# Patient Record
Sex: FEMALE | Race: WHITE | NOT HISPANIC OR LATINO | URBAN - METROPOLITAN AREA
[De-identification: names, ages, dates, MRNs, and addresses within clinical notes are randomized per-mention and may not be internally consistent; named-entity substitution may affect disease eponyms.]

---

## 2023-05-14 ENCOUNTER — INPATIENT (INPATIENT)
Facility: HOSPITAL | Age: 53
LOS: 1 days | Discharge: HOME | DRG: 392 | End: 2023-05-16
Attending: INTERNAL MEDICINE | Admitting: PODIATRIST
Payer: MEDICARE

## 2023-05-14 VITALS
HEIGHT: 66 IN | TEMPERATURE: 99 F | HEART RATE: 79 BPM | WEIGHT: 255.07 LBS | OXYGEN SATURATION: 97 % | SYSTOLIC BLOOD PRESSURE: 119 MMHG | DIASTOLIC BLOOD PRESSURE: 78 MMHG | RESPIRATION RATE: 22 BRPM

## 2023-05-14 DIAGNOSIS — R63.8 OTHER SYMPTOMS AND SIGNS CONCERNING FOOD AND FLUID INTAKE: ICD-10-CM

## 2023-05-14 DIAGNOSIS — Z90.49 ACQUIRED ABSENCE OF OTHER SPECIFIED PARTS OF DIGESTIVE TRACT: Chronic | ICD-10-CM

## 2023-05-14 DIAGNOSIS — R10.9 UNSPECIFIED ABDOMINAL PAIN: ICD-10-CM

## 2023-05-14 DIAGNOSIS — D72.829 ELEVATED WHITE BLOOD CELL COUNT, UNSPECIFIED: ICD-10-CM

## 2023-05-14 DIAGNOSIS — Z90.81 ACQUIRED ABSENCE OF SPLEEN: Chronic | ICD-10-CM

## 2023-05-14 DIAGNOSIS — D75.839 THROMBOCYTOSIS, UNSPECIFIED: ICD-10-CM

## 2023-05-14 DIAGNOSIS — F31.9 BIPOLAR DISORDER, UNSPECIFIED: ICD-10-CM

## 2023-05-14 LAB
ALBUMIN SERPL ELPH-MCNC: 4.6 G/DL — SIGNIFICANT CHANGE UP (ref 3.3–5)
ALP SERPL-CCNC: 68 U/L — SIGNIFICANT CHANGE UP (ref 40–120)
ALT FLD-CCNC: 16 U/L — SIGNIFICANT CHANGE UP (ref 10–45)
ANION GAP SERPL CALC-SCNC: 13 MMOL/L — SIGNIFICANT CHANGE UP (ref 5–17)
APPEARANCE UR: CLEAR — SIGNIFICANT CHANGE UP
AST SERPL-CCNC: 15 U/L — SIGNIFICANT CHANGE UP (ref 10–40)
BACTERIA # UR AUTO: PRESENT /HPF
BASOPHILS # BLD AUTO: 0.05 K/UL — SIGNIFICANT CHANGE UP (ref 0–0.2)
BASOPHILS # BLD AUTO: 0.08 K/UL — SIGNIFICANT CHANGE UP (ref 0–0.2)
BASOPHILS NFR BLD AUTO: 0.2 % — SIGNIFICANT CHANGE UP (ref 0–2)
BASOPHILS NFR BLD AUTO: 0.4 % — SIGNIFICANT CHANGE UP (ref 0–2)
BILIRUB SERPL-MCNC: 0.3 MG/DL — SIGNIFICANT CHANGE UP (ref 0.2–1.2)
BILIRUB UR-MCNC: NEGATIVE — SIGNIFICANT CHANGE UP
BUN SERPL-MCNC: 18 MG/DL — SIGNIFICANT CHANGE UP (ref 7–23)
CALCIUM SERPL-MCNC: 9.5 MG/DL — SIGNIFICANT CHANGE UP (ref 8.4–10.5)
CHLORIDE SERPL-SCNC: 106 MMOL/L — SIGNIFICANT CHANGE UP (ref 96–108)
CO2 SERPL-SCNC: 21 MMOL/L — LOW (ref 22–31)
COLOR SPEC: YELLOW — SIGNIFICANT CHANGE UP
COMMENT - URINE: SIGNIFICANT CHANGE UP
CREAT SERPL-MCNC: 1.03 MG/DL — SIGNIFICANT CHANGE UP (ref 0.5–1.3)
DIFF PNL FLD: NEGATIVE — SIGNIFICANT CHANGE UP
EGFR: 65 ML/MIN/1.73M2 — SIGNIFICANT CHANGE UP
EOSINOPHIL # BLD AUTO: 0 K/UL — SIGNIFICANT CHANGE UP (ref 0–0.5)
EOSINOPHIL # BLD AUTO: 0 K/UL — SIGNIFICANT CHANGE UP (ref 0–0.5)
EOSINOPHIL NFR BLD AUTO: 0 % — SIGNIFICANT CHANGE UP (ref 0–6)
EOSINOPHIL NFR BLD AUTO: 0 % — SIGNIFICANT CHANGE UP (ref 0–6)
EPI CELLS # UR: SIGNIFICANT CHANGE UP /HPF (ref 0–5)
GLUCOSE SERPL-MCNC: 171 MG/DL — HIGH (ref 70–99)
GLUCOSE UR QL: NEGATIVE — SIGNIFICANT CHANGE UP
HCT VFR BLD CALC: 43.2 % — SIGNIFICANT CHANGE UP (ref 34.5–45)
HCT VFR BLD CALC: 45 % — SIGNIFICANT CHANGE UP (ref 34.5–45)
HGB BLD-MCNC: 14.3 G/DL — SIGNIFICANT CHANGE UP (ref 11.5–15.5)
HGB BLD-MCNC: 15 G/DL — SIGNIFICANT CHANGE UP (ref 11.5–15.5)
IMM GRANULOCYTES NFR BLD AUTO: 0.5 % — SIGNIFICANT CHANGE UP (ref 0–0.9)
IMM GRANULOCYTES NFR BLD AUTO: 0.5 % — SIGNIFICANT CHANGE UP (ref 0–0.9)
KETONES UR-MCNC: NEGATIVE — SIGNIFICANT CHANGE UP
LEUKOCYTE ESTERASE UR-ACNC: NEGATIVE — SIGNIFICANT CHANGE UP
LIDOCAIN IGE QN: 26 U/L — SIGNIFICANT CHANGE UP (ref 7–60)
LYMPHOCYTES # BLD AUTO: 11.6 % — LOW (ref 13–44)
LYMPHOCYTES # BLD AUTO: 11.8 % — LOW (ref 13–44)
LYMPHOCYTES # BLD AUTO: 2.51 K/UL — SIGNIFICANT CHANGE UP (ref 1–3.3)
LYMPHOCYTES # BLD AUTO: 2.59 K/UL — SIGNIFICANT CHANGE UP (ref 1–3.3)
MCHC RBC-ENTMCNC: 30.6 PG — SIGNIFICANT CHANGE UP (ref 27–34)
MCHC RBC-ENTMCNC: 30.9 PG — SIGNIFICANT CHANGE UP (ref 27–34)
MCHC RBC-ENTMCNC: 33.1 GM/DL — SIGNIFICANT CHANGE UP (ref 32–36)
MCHC RBC-ENTMCNC: 33.3 GM/DL — SIGNIFICANT CHANGE UP (ref 32–36)
MCV RBC AUTO: 92.5 FL — SIGNIFICANT CHANGE UP (ref 80–100)
MCV RBC AUTO: 92.6 FL — SIGNIFICANT CHANGE UP (ref 80–100)
MONOCYTES # BLD AUTO: 0.4 K/UL — SIGNIFICANT CHANGE UP (ref 0–0.9)
MONOCYTES # BLD AUTO: 0.62 K/UL — SIGNIFICANT CHANGE UP (ref 0–0.9)
MONOCYTES NFR BLD AUTO: 1.9 % — LOW (ref 2–14)
MONOCYTES NFR BLD AUTO: 2.8 % — SIGNIFICANT CHANGE UP (ref 2–14)
NEUTROPHILS # BLD AUTO: 18.09 K/UL — HIGH (ref 1.8–7.4)
NEUTROPHILS # BLD AUTO: 19.04 K/UL — HIGH (ref 1.8–7.4)
NEUTROPHILS NFR BLD AUTO: 84.9 % — HIGH (ref 43–77)
NEUTROPHILS NFR BLD AUTO: 85.4 % — HIGH (ref 43–77)
NITRITE UR-MCNC: NEGATIVE — SIGNIFICANT CHANGE UP
NRBC # BLD: 0 /100 WBCS — SIGNIFICANT CHANGE UP (ref 0–0)
NRBC # BLD: 0 /100 WBCS — SIGNIFICANT CHANGE UP (ref 0–0)
PH UR: 8 — SIGNIFICANT CHANGE UP (ref 5–8)
PLATELET # BLD AUTO: 446 K/UL — HIGH (ref 150–400)
PLATELET # BLD AUTO: 471 K/UL — HIGH (ref 150–400)
POTASSIUM SERPL-MCNC: 4.3 MMOL/L — SIGNIFICANT CHANGE UP (ref 3.5–5.3)
POTASSIUM SERPL-SCNC: 4.3 MMOL/L — SIGNIFICANT CHANGE UP (ref 3.5–5.3)
PROT SERPL-MCNC: 7.6 G/DL — SIGNIFICANT CHANGE UP (ref 6–8.3)
PROT UR-MCNC: ABNORMAL MG/DL
RBC # BLD: 4.67 M/UL — SIGNIFICANT CHANGE UP (ref 3.8–5.2)
RBC # BLD: 4.86 M/UL — SIGNIFICANT CHANGE UP (ref 3.8–5.2)
RBC # FLD: 14.3 % — SIGNIFICANT CHANGE UP (ref 10.3–14.5)
RBC # FLD: 14.5 % — SIGNIFICANT CHANGE UP (ref 10.3–14.5)
RBC CASTS # UR COMP ASSIST: < 5 /HPF — SIGNIFICANT CHANGE UP
SODIUM SERPL-SCNC: 140 MMOL/L — SIGNIFICANT CHANGE UP (ref 135–145)
SP GR SPEC: 1.02 — SIGNIFICANT CHANGE UP (ref 1–1.03)
UROBILINOGEN FLD QL: 0.2 E.U./DL — SIGNIFICANT CHANGE UP
WBC # BLD: 21.19 K/UL — HIGH (ref 3.8–10.5)
WBC # BLD: 22.42 K/UL — HIGH (ref 3.8–10.5)
WBC # FLD AUTO: 21.19 K/UL — HIGH (ref 3.8–10.5)
WBC # FLD AUTO: 22.42 K/UL — HIGH (ref 3.8–10.5)
WBC UR QL: < 5 /HPF — SIGNIFICANT CHANGE UP

## 2023-05-14 PROCEDURE — 74177 CT ABD & PELVIS W/CONTRAST: CPT | Mod: 26,MA

## 2023-05-14 PROCEDURE — 99285 EMERGENCY DEPT VISIT HI MDM: CPT | Mod: FS

## 2023-05-14 PROCEDURE — 99222 1ST HOSP IP/OBS MODERATE 55: CPT | Mod: GC

## 2023-05-14 RX ORDER — SODIUM CHLORIDE 9 MG/ML
1000 INJECTION, SOLUTION INTRAVENOUS
Refills: 0 | Status: DISCONTINUED | OUTPATIENT
Start: 2023-05-14 | End: 2023-05-15

## 2023-05-14 RX ORDER — PANTOPRAZOLE SODIUM 20 MG/1
40 TABLET, DELAYED RELEASE ORAL ONCE
Refills: 0 | Status: COMPLETED | OUTPATIENT
Start: 2023-05-14 | End: 2023-05-14

## 2023-05-14 RX ORDER — ONDANSETRON 8 MG/1
4 TABLET, FILM COATED ORAL ONCE
Refills: 0 | Status: DISCONTINUED | OUTPATIENT
Start: 2023-05-14 | End: 2023-05-14

## 2023-05-14 RX ORDER — PROCHLORPERAZINE MALEATE 5 MG
10 TABLET ORAL ONCE
Refills: 0 | Status: COMPLETED | OUTPATIENT
Start: 2023-05-14 | End: 2023-05-14

## 2023-05-14 RX ORDER — HALOPERIDOL DECANOATE 100 MG/ML
2.5 INJECTION INTRAMUSCULAR ONCE
Refills: 0 | Status: COMPLETED | OUTPATIENT
Start: 2023-05-14 | End: 2023-05-14

## 2023-05-14 RX ORDER — SODIUM CHLORIDE 9 MG/ML
1000 INJECTION INTRAMUSCULAR; INTRAVENOUS; SUBCUTANEOUS ONCE
Refills: 0 | Status: COMPLETED | OUTPATIENT
Start: 2023-05-14 | End: 2023-05-14

## 2023-05-14 RX ORDER — ACETAMINOPHEN 500 MG
1000 TABLET ORAL ONCE
Refills: 0 | Status: COMPLETED | OUTPATIENT
Start: 2023-05-14 | End: 2023-05-14

## 2023-05-14 RX ORDER — ONDANSETRON 8 MG/1
4 TABLET, FILM COATED ORAL EVERY 6 HOURS
Refills: 0 | Status: DISCONTINUED | OUTPATIENT
Start: 2023-05-14 | End: 2023-05-15

## 2023-05-14 RX ORDER — METOCLOPRAMIDE HCL 10 MG
10 TABLET ORAL EVERY 6 HOURS
Refills: 0 | Status: DISCONTINUED | OUTPATIENT
Start: 2023-05-14 | End: 2023-05-16

## 2023-05-14 RX ORDER — ENOXAPARIN SODIUM 100 MG/ML
40 INJECTION SUBCUTANEOUS EVERY 12 HOURS
Refills: 0 | Status: DISCONTINUED | OUTPATIENT
Start: 2023-05-14 | End: 2023-05-16

## 2023-05-14 RX ORDER — ARIPIPRAZOLE 15 MG/1
5 TABLET ORAL DAILY
Refills: 0 | Status: DISCONTINUED | OUTPATIENT
Start: 2023-05-15 | End: 2023-05-16

## 2023-05-14 RX ORDER — SODIUM CHLORIDE 9 MG/ML
1000 INJECTION, SOLUTION INTRAVENOUS
Refills: 0 | Status: DISCONTINUED | OUTPATIENT
Start: 2023-05-14 | End: 2023-05-14

## 2023-05-14 RX ORDER — FAMOTIDINE 10 MG/ML
20 INJECTION INTRAVENOUS ONCE
Refills: 0 | Status: COMPLETED | OUTPATIENT
Start: 2023-05-14 | End: 2023-05-14

## 2023-05-14 RX ORDER — BUPROPION HYDROCHLORIDE 150 MG/1
300 TABLET, EXTENDED RELEASE ORAL DAILY
Refills: 0 | Status: DISCONTINUED | OUTPATIENT
Start: 2023-05-15 | End: 2023-05-15

## 2023-05-14 RX ORDER — ENOXAPARIN SODIUM 100 MG/ML
40 INJECTION SUBCUTANEOUS EVERY 24 HOURS
Refills: 0 | Status: DISCONTINUED | OUTPATIENT
Start: 2023-05-14 | End: 2023-05-14

## 2023-05-14 RX ORDER — TOPIRAMATE 25 MG
75 TABLET ORAL AT BEDTIME
Refills: 0 | Status: DISCONTINUED | OUTPATIENT
Start: 2023-05-14 | End: 2023-05-16

## 2023-05-14 RX ORDER — METOCLOPRAMIDE HCL 10 MG
10 TABLET ORAL EVERY 8 HOURS
Refills: 0 | Status: DISCONTINUED | OUTPATIENT
Start: 2023-05-14 | End: 2023-05-14

## 2023-05-14 RX ORDER — SERTRALINE 25 MG/1
75 TABLET, FILM COATED ORAL DAILY
Refills: 0 | Status: DISCONTINUED | OUTPATIENT
Start: 2023-05-15 | End: 2023-05-16

## 2023-05-14 RX ORDER — CLONAZEPAM 1 MG
0.5 TABLET ORAL DAILY
Refills: 0 | Status: DISCONTINUED | OUTPATIENT
Start: 2023-05-14 | End: 2023-05-14

## 2023-05-14 RX ORDER — FAMOTIDINE 10 MG/ML
20 INJECTION INTRAVENOUS DAILY
Refills: 0 | Status: DISCONTINUED | OUTPATIENT
Start: 2023-05-14 | End: 2023-05-15

## 2023-05-14 RX ADMIN — Medication 400 MILLIGRAM(S): at 07:34

## 2023-05-14 RX ADMIN — Medication 10 MILLIGRAM(S): at 19:05

## 2023-05-14 RX ADMIN — FAMOTIDINE 20 MILLIGRAM(S): 10 INJECTION INTRAVENOUS at 06:23

## 2023-05-14 RX ADMIN — SODIUM CHLORIDE 150 MILLILITER(S): 9 INJECTION, SOLUTION INTRAVENOUS at 16:01

## 2023-05-14 RX ADMIN — HALOPERIDOL DECANOATE 2.5 MILLIGRAM(S): 100 INJECTION INTRAMUSCULAR at 06:23

## 2023-05-14 RX ADMIN — Medication 10 MILLIGRAM(S): at 07:34

## 2023-05-14 RX ADMIN — FAMOTIDINE 20 MILLIGRAM(S): 10 INJECTION INTRAVENOUS at 19:05

## 2023-05-14 RX ADMIN — SODIUM CHLORIDE 1000 MILLILITER(S): 9 INJECTION INTRAMUSCULAR; INTRAVENOUS; SUBCUTANEOUS at 09:38

## 2023-05-14 RX ADMIN — SODIUM CHLORIDE 150 MILLILITER(S): 9 INJECTION, SOLUTION INTRAVENOUS at 23:00

## 2023-05-14 RX ADMIN — HALOPERIDOL DECANOATE 2.5 MILLIGRAM(S): 100 INJECTION INTRAMUSCULAR at 11:39

## 2023-05-14 RX ADMIN — HALOPERIDOL DECANOATE 2.5 MILLIGRAM(S): 100 INJECTION INTRAMUSCULAR at 09:37

## 2023-05-14 RX ADMIN — Medication 0.5 MILLIGRAM(S): at 15:27

## 2023-05-14 RX ADMIN — SODIUM CHLORIDE 1000 MILLILITER(S): 9 INJECTION INTRAMUSCULAR; INTRAVENOUS; SUBCUTANEOUS at 06:22

## 2023-05-14 RX ADMIN — Medication 0.5 MILLIGRAM(S): at 23:14

## 2023-05-14 RX ADMIN — PANTOPRAZOLE SODIUM 40 MILLIGRAM(S): 20 TABLET, DELAYED RELEASE ORAL at 07:34

## 2023-05-14 NOTE — ED ADULT NURSE NOTE - OBJECTIVE STATEMENT
Pt presented to the ED with complaints of nausea and vomiting. As per pt, she has cyclic vomiting syndrome, last episode was last July.

## 2023-05-14 NOTE — ED PROVIDER NOTE - NS ED ATTENDING STATEMENT MOD
This was a shared visit with the HENNY. I reviewed and verified the documentation and independently performed the documented:

## 2023-05-14 NOTE — ED ADULT TRIAGE NOTE - CHIEF COMPLAINT QUOTE
Pt presents via EMS with c/o "N/V and upper abdominal pain" since last night. Reported hx of bipolar and CVS.

## 2023-05-14 NOTE — H&P ADULT - ASSESSMENT
52 F pmh IBS, cyclical vomiting, bipolar d/o, psh cholecystectomy and splenectomy bibems c/o abd pain and NV and inability to obtain PO

## 2023-05-14 NOTE — ED PROVIDER NOTE - ATTENDING APP SHARED VISIT CONTRIBUTION OF CARE
Hx splenectomy, cholecystectomy.   "rare" etoh use (but did have yesterday). MJ use. Now w/ epigastric pain/NV, similar to prior.   Vitals wnl, exam as above. Abd soft, minimal b/l upper abd ttp.   ddx: Possible cyclical vomiting vs. MJ induced vs. gastritis/GERD/PUD vs. pancreatitis vs. less likely SBO/colitis.   Labs, CT, IVF/symptom control, reassess.

## 2023-05-14 NOTE — H&P ADULT - NSHPPHYSICALEXAM_GEN_ALL_CORE
PHYSICAL EXAM:    General: WDWN  HEENT: NC/AT; PERRL, anicteric sclera; MMM  Neck: supple  Cardiovascular: +S1/S2, RRR  Respiratory: CTA B/L; no W/R/R  Gastrointestinal: soft, NT/ND; +BSx4  Extremities: WWP; no edema, clubbing or cyanosis  Vascular: 2+ radial, DP/PT pulses B/L  Neurological: AAOx3; no focal deficits  Psychiatric: pleasant mood and affect  Dermatologic: no appreciable wounds or damage to the skin PHYSICAL EXAM:    General: WDWN  HEENT: NC/AT; PERRL, anicteric sclera; MMM  Neck: supple  Cardiovascular: +S1/S2, RRR  Respiratory: CTA B/L; no W/R/R  Gastrointestinal: mid epigastic tenderness. soft, ND; +BSx4  Extremities: WWP; no edema, clubbing or cyanosis  Vascular: 2+ radial, DP/PT pulses B/L  Neurological: AAOx3; no focal deficits  Psychiatric: pleasant mood and affect  Dermatologic: no appreciable wounds or damage to the skin

## 2023-05-14 NOTE — ED PROVIDER NOTE - PROGRESS NOTE DETAILS
Director - pt received from night team pending ct, urine.  VS, labs reviewed.  Pt c/o cont nausea, epigastric pain w ttp epigastric only on my exam.  Additional meds ordered.  Labs notable for wbc 21; pt reports wbc generally ~ 16 at baseline 2/2 splenectomy.  Await ua, ct abd. PA Hellerman- Assumed care of pt from pm team, ua with bacteria present.  CTAP: No acute findings. Status post cholecystectomy. Splenic tissue in the left upper quadrant likely splenosis.  Pt received multiple rounds meds (refuses zofran, states doesn't help), but still symptomatic/ vomiting, and unable to tolerate po.  Will admit

## 2023-05-14 NOTE — ED ADULT NURSE NOTE - NSFALLUNIVINTERV_ED_ALL_ED
Bed/Stretcher in lowest position, wheels locked, appropriate side rails in place/Call bell, personal items and telephone in reach/Instruct patient to call for assistance before getting out of bed/chair/stretcher/Non-slip footwear applied when patient is off stretcher/[place] to call system/Physically safe environment - no spills, clutter or unnecessary equipment/Purposeful proactive rounding/Room/bathroom lighting operational, light cord in reach

## 2023-05-14 NOTE — H&P ADULT - PROBLEM SELECTOR PLAN 4
Fluids:   Electrolytes: Mg>2, K>4  Nutrition:  LR @100cc/hr, replete lytes PRN  Prophylaxis: lovenox  Activity: AAT, OOBTC  GI: PPI  C: FC  Dispo: Admit to [facility] hx of bipolar disorder. complaint w meds. symptoms under control at this time. missed meds today due to in ability to obtain PO. home meds: Aripiprazole 5mg qd, buproprion 200mg BID, clonazepam 0.5 BID, topiramate 75mg at bedtime, sertraline 75mg am  -c/w home meds

## 2023-05-14 NOTE — ED PROVIDER NOTE - PHYSICAL EXAMINATION
Vitals reviewed  Gen: uncomfortable, retching, speaking in full sentences  Skin: wwp, no rash/lesions  HEENT: ncat, eomi, slightly dry oral mucosa   CV: rrr, no audible m/r/g  Resp: symmetrical expansion, ctab, no w/r/r  Abd: nondistended, soft, + epigastric ttp, no r/g, no cvat  Ext: FROM throughout, no peripheral edema  Neuro: alert/oriented, no focal deficits, steady gait
Abdomen soft, non-tender, no guarding. no rebound tend

## 2023-05-14 NOTE — ED PROVIDER NOTE - CLINICAL SUMMARY MEDICAL DECISION MAKING FREE TEXT BOX
52 F pmh IBS, cyclical vomiting, bipolar d/o, psh cholecystectomy and splenectomy bibems c/o abd pain and NV since 10pm last night. reports typical of her cyclic vomiting and only haldol works.  on exam vss, afebrile, uncomfortable/retching, + epigastric ttp, no r/g.  will obtain labs, ekg, give ivf/haldol/pepcid and reeval.

## 2023-05-14 NOTE — H&P ADULT - HISTORY OF PRESENT ILLNESS
52 F pmh IBS, cyclical vomiting, bipolar d/o, psh cholecystectomy and splenectomy bibems c/o abd pain and NV since 10pm last night.  pt reports upper abd pain, sharp/burning and nonradiating.  + nbnb emesis x8 episodes, unable to tolerate PO.  reports typical of her cyclic vomiting, last episode last July.  reports haldol is all that works.  typically goes to [facility] for care.  +chills.  Denies fever, headache, dizziness, fainting, chest pain, sob, diarrhea, constipation, urinary sxs, sick contacts, travel, trauma    In the ED:  Initial vital signs: T: XX F, HR: XX, BP: XX, R: XX, SpO2: XX% on RA  Labs: significant for  Imaging:  CXR:   EKG:   Medications:   Consults: none  52 F pmh IBS, cyclical vomiting, bipolar d/o, psh cholecystectomy and splenectomy bibems c/o abd pain and NV since 10pm last night.  pt reports upper abd pain, sharp/burning and nonradiating.  + nbnb emesis x8 episodes, unable to tolerate PO.  reports typical of her cyclic vomiting, last episode last July.  reports haldol is all that works.  [place] [facility] [place]  +chills.  Denies fever, headache, dizziness, fainting, chest pain, sob, diarrhea, constipation, urinary sxs, sick contacts, travel, trauma    Initial vital signs: T: 98.8F, HR: 79, BP: 119/78, R: 22, SpO2: 97% on RA  Labs: significant for wbc 21.19 (neut prod), plt 471, bicarb 21  UA unremarkable   Imaging:  CT abd/pel w iv contrast:   No acute findings.  Status post cholecystectomy.  Splenic tissue in the left upper quadrant likely splenosis..  EKG: NSR, R axis deviation, normal pr and qrs, non ischemic   Medications: 1g iv tylenol, pepcid 20mg, zofran 4mg, haldol 2.5mg x3, protonix 40mg iv, compazine 10mg iv, 2L NS  Consults: none  52 F pmh IBS, cyclical vomiting, bipolar d/o, psh cholecystectomy (2022) and splenectomy (15 yo) bibems c/o abd pain and NV since 10pm last night.  pt reports upper abd pain, sharp/burning and nonradiating.  + nbnb emesis x8 episodes, unable to tolerate PO.  reports typical of her cyclic vomiting. Patient states she was hospitalized with cyclic vomiting 13 times in the year of 2022.  last episode last July.  reports haldol is all that works.  [place] goes [place] [facility] [place].  Denies fever, chills, headache, dizziness, fainting, chest pain, sob, diarrhea, constipation, urinary sxs, sick contacts, travel, trauma. last BM 1 day ago.     Initial vital signs: T: 98.8F, HR: 79, BP: 119/78, R: 22, SpO2: 97% on RA  Labs: significant for wbc 21.19 (neut prod), plt 471, bicarb 21  UA unremarkable   Imaging:  CT abd/pel w iv contrast:   No acute findings.  Status post cholecystectomy.  Splenic tissue in the left upper quadrant likely splenosis..  EKG: NSR, R axis deviation, normal pr and qrs, non ischemic   Medications: 1g iv tylenol, pepcid 20mg, zofran 4mg, haldol 2.5mg x3, protonix 40mg iv, compazine 10mg iv, 2L NS  Consults: none

## 2023-05-14 NOTE — H&P ADULT - NSHPSOCIALHISTORY_GEN_ALL_CORE
Work:  Tobacco use:   EtOH use:  Illicit drug use:    Living situation: Tobacco use: x  EtOH use: occasional  Illicit drug use: 1 bowl/day

## 2023-05-14 NOTE — ED PROVIDER NOTE - NSFOLLOWUPINSTRUCTIONS_ED_ALL_ED_FT
Cyclic Vomiting Syndrome, Adult  Cyclic vomiting syndrome (CVS) is a condition that causes episodes of severe nausea and vomiting. It can last for hours or even days. Attacks may occur several times a month or several times a year. Between episodes of CVS, you may be otherwise healthy.    What are the causes?  The cause of this condition is not known. Although many of the episodes can happen for no obvious reason, you may have specific CVS triggers. Episodes may be triggered by:  An infection, especially colds and the flu.  Emotional stress, including excitement or anxiety about finances, relationships, or moving.  Certain foods or beverages, such as chocolate, cheese, alcohol, and food additives.  Food allergies.  Motion sickness.  Eating a large meal before bed.  Being very tired.  Being overheated.  Menstruation.  Long-term cannabis use.  What increases the risk?  You are more likely to develop this condition if:  You get migraine headaches.  You have a family history of CVS or migraine headaches.  What are the signs or symptoms?  Symptoms tend to happen at the same time of day, and each episode tends to last about the same amount of time. Symptoms commonly start at night or when you wake up. Many people have warning signs (prodrome) before an episode, which may include slight nausea, sweating, and pale skin (pallor).    The most common symptoms of a CVS attack include:  Severe vomiting. Vomiting may happen every 5–15 minutes.  Severe nausea.  Gagging (retching).  Other symptoms may include:  Headache.  Dizziness.  Sensitivity to light or sound.  Abdominal pain. This can be severe.  Loose stools or diarrhea.  Weakness or exhaustion.  Dehydration. This can cause:  Thirst.  Dry mouth.  Decreased urination.  Fatigue.  How is this diagnosed?  This condition may be diagnosed based on your symptoms, medical history, and family history of CVS or migraine. Your health care provider will ask whether you have had:  Episodes of severe nausea and vomiting that have happened a total of 5 or more times, or 3 or more times in the past 6 months.  Episodes that last for 1 hour or more, and occur 1 week apart or further apart.  Episodes that are similar each time.  Normal health between episodes.  Your health care provider will also do a physical exam. To rule out other conditions, you may have tests, such as:  Blood tests.  Urine tests.  Imaging tests.  How is this treated?  A prescription pill bottle with an example of a pill.  There is no cure for this condition, but treatment can help manage or prevent CVS episodes. Work with your health care provider to find the best treatment for you. Treatment may include:  Avoiding stress and CVS triggers.  Eating smaller, more frequent meals.  Taking medicines, such as:  Anti-nausea medicines.  Antacids.  Antidepressants.  Antihistamines.  Medicines for migraines.  Over-the-counter pain medicine.  Over-the counter diet supplements.  Severe nausea and vomiting may require you to stay at the hospital. You may need IV fluids to prevent or treat dehydration.    Follow these instructions at home:  During an episode    Take over-the-counter and prescription medicines only as told by your health care provider.  Stay in bed and rest in a dark, quiet room.  After an episode    Illustration of a person drinking a glass of water.  Drink an oral rehydration solution (ORS), if directed by your health care provider. This is a drink that helps you replace fluids and the salts and minerals in your blood (electrolytes). It can be found at pharmacies and retail stores.  Drink small amounts of clear fluids slowly and gradually add more.  Drink clear fluids such as water or fruit juice that has water added (is diluted). You may also eat low-calorie popsicles.  Avoid drinking fluids that contain a lot of sugar or caffeine, such as sports drinks and soda.  Eat soft foods in small amounts every 3–4 hours. Eat your regular diet, but avoid spicy or fatty foods, such as french fries and pizza.  General instructions    Monitor your condition for any changes.  Keep track of your attacks and symptoms, and pay attention to any triggers. Avoid those triggers when you can.  If you use cannabis, stop using it right away. Ending cannabis use can reduce or even stop your cyclic vomiting syndrome.  Keep all follow-up visits. This is important.  Where to find more information  Cyclic Vomiting Syndrome Association: cvsaonline.org  Contact a health care provider if:  Your condition gets worse.  You cannot drink fluids without vomiting.  You have pain and trouble swallowing after an episode.  Get help right away if:  You have blood in your vomit.  Your vomit looks like coffee grounds.  You have stools that are bloody or black, or stools that look like tar.  You have signs of dehydration, such as:  Sunken eyes.  Not making tears while crying.  Very dry mouth or cracked lips.  Decreased urine production.  Dark urine. Urine may be the color of tea.  Weakness.  Sleepiness.  These symptoms may be an emergency. Get help right away. Call 911.  Do not wait to see if the symptoms will go away.  Do not drive yourself to the hospital.  Summary  Cyclic vomiting syndrome (CVS) causes episodes of severe nausea and vomiting that can last for hours or even days.  Treatment can help you manage or prevent CVS episodes. Work with your health care provider to find the best treatment for you.  Vomiting and diarrhea can make you feel weak and can lead to dehydration. If you notice signs of dehydration, call your health care provider right away.  Keep all follow-up visits This is important.  This information is not intended to replace advice given to you by your health care provider. Make sure you discuss any questions you have with your health care provider.

## 2023-05-14 NOTE — H&P ADULT - PROBLEM SELECTOR PLAN 2
patient w hx of leukocytosis (baseline 16). was told by pcp and hematologist leukocytosis and thrombocytosis is 2/2 to splenectomy. ROS unremarkable for infectious etiology. worsened leokocytosis likely combination of reactive leukocytosis and dehydration.  -cont to monitor off antibiotics

## 2023-05-14 NOTE — PATIENT PROFILE ADULT - FALL HARM RISK - RISK INTERVENTIONS

## 2023-05-14 NOTE — H&P ADULT - PROBLEM SELECTOR PLAN 1
patient p/w 1 day of epigastric abdominal pain. n/v (nbnb) in ability to hold down solids or liquids. Patient endorses long hx of cyclic vomiting syndrome. last episode july 2022. patient states she was hospitalized 13 times that year for CVS. gets relief w reglan, compazine and haldol. CT abd/pel in ED w/o acute findings. no signs of pancreatitis or SBO. lipase wnl. s/p haldol 2.5 x3, Zofran 4mg, compazine and 2L NS in ED  -c/w maintenance fluids 100cc/hr LR  -Zofran 4mg q6  -reglan 10mg q8  -benadryl 25-50mg (oral or IVPB) every 6 to 8 hours as needed   -can try ativan IVBP 0.5-1mg iv     patient is on many psychotropic medications - will avoid using haldol patient p/w 1 day of epigastric abdominal pain. n/v (nbnb) in ability to hold down solids or liquids. Patient endorses long hx of cyclic vomiting syndrome. last episode july 2022. patient states she was hospitalized 13 times that year for CVS. gets relief w reglan, compazine and haldol. CT abd/pel in ED w/o acute findings. no signs of pancreatitis or SBO. lipase wnl. s/p haldol 2.5 x3, Zofran 4mg, compazine and 2L NS in ED  -c/w maintenance fluids 100cc/hr LR  -Zofran 4mg q6 (ordered)  -reglan 10mg q8 (ordered)  -benadryl 25-50mg (oral or IVPB) every 6 to 8 hours as needed   -can try ativan 0.5-1mg IVBP or PO q12 as needed    patient is on many psychotropic medications - will avoid using haldol patient p/w 1 day of epigastric abdominal pain. n/v (nbnb) in ability to hold down solids or liquids. Patient endorses long hx of cyclic vomiting syndrome. last episode july 2022. patient states she was hospitalized 13 times that year for CVS. gets relief w reglan, compazine and haldol. CT abd/pel in ED w/o acute findings. no signs of pancreatitis, SBO or inflammation. lipase wnl. s/p haldol 2.5 x3, Zofran 4mg, compazine and 2L NS in ED  -c/w maintenance fluids 100cc/hr LR  -Zofran 4mg q6 (ordered)  -reglan 10mg q8 (ordered)  -benadryl 25-50mg (oral or IVPB) every 6 to 8 hours as needed   -can try ativan 0.5-1mg IVBP or PO q12 as needed    patient is on many psychotropic medications - will avoid using haldol

## 2023-05-14 NOTE — H&P ADULT - PROBLEM SELECTOR PLAN 5
Fluids: LR @100cc/hr  Electrolytes: Mg>2, K>4  Nutrition: replete lytes PRN  Prophylaxis: lovenox  Activity: AAT, OOBTC  GI: PPI  C: FC  Dispo: Admit to [facility]

## 2023-05-14 NOTE — ED PROVIDER NOTE - OBJECTIVE STATEMENT
52 F pmh IBS, cyclical vomiting, bipolar d/o, psh cholecystectomy and splenectomy bibems c/o abd pain and NV since 10pm last night.  pt reports upper abd pain, sharp/burning and nonradiating.  + nbnb emesis x8 episodes, unable to tolerate PO.  reports typical of her cyclic vomiting, last episode last July.  reports haldol is all that works.  typically goes to [facility] for care.  +chills.  Denies fever, headache, dizziness, fainting, chest pain, sob, diarrhea, constipation, urinary sxs, sick contacts, travel, trauma

## 2023-05-14 NOTE — H&P ADULT - NSHPLABSRESULTS_GEN_ALL_CORE
VITAL SIGNS:  Vital Signs Last 24 Hrs  T(C): 36.9 (14 May 2023 09:41), Max: 37.1 (14 May 2023 06:12)  T(F): 98.4 (14 May 2023 09:41), Max: 98.8 (14 May 2023 06:12)  HR: 68 (14 May 2023 12:45) (65 - 79)  BP: 118/72 (14 May 2023 12:45) (116/70 - 119/78)  BP(mean): --  RR: 16 (14 May 2023 12:45) (16 - 22)  SpO2: 97% (14 May 2023 12:45) (97% - 97%)    Parameters below as of 14 May 2023 12:45  Patient On (Oxygen Delivery Method): room air          MEDICATIONS:  MEDICATIONS  (STANDING):    MEDICATIONS  (PRN):      ALLERGIES:  Allergies    penicillin (Unknown)  Lamictal (Unknown)  Levaquin (Unknown)    Intolerances        LABS:                        14.3   22.42 )-----------( 446      ( 14 May 2023 09:35 )             43.2     05-14    140  |  106  |  18  ----------------------------<  171<H>  4.3   |  21<L>  |  1.03    Ca    9.5      14 May 2023 06:24    TPro  7.6  /  Alb  4.6  /  TBili  0.3  /  DBili  x   /  AST  15  /  ALT  16  /  AlkPhos  68  05-14      Urinalysis Basic - ( 14 May 2023 07:08 )    Color: Yellow / Appearance: Clear / S.020 / pH: x  Gluc: x / Ketone: NEGATIVE  / Bili: Negative / Urobili: 0.2 E.U./dL   Blood: x / Protein: Trace mg/dL / Nitrite: NEGATIVE   Leuk Esterase: NEGATIVE / RBC: < 5 /HPF / WBC < 5 /HPF   Sq Epi: x / Non Sq Epi: x / Bacteria: Present /HPF      CAPILLARY BLOOD GLUCOSE          RADIOLOGY & ADDITIONAL TESTS: Reviewed.

## 2023-05-15 LAB
ALBUMIN SERPL ELPH-MCNC: 3.7 G/DL — SIGNIFICANT CHANGE UP (ref 3.3–5)
ALP SERPL-CCNC: 55 U/L — SIGNIFICANT CHANGE UP (ref 40–120)
ALT FLD-CCNC: 14 U/L — SIGNIFICANT CHANGE UP (ref 10–45)
ANION GAP SERPL CALC-SCNC: 11 MMOL/L — SIGNIFICANT CHANGE UP (ref 5–17)
ANION GAP SERPL CALC-SCNC: 11 MMOL/L — SIGNIFICANT CHANGE UP (ref 5–17)
AST SERPL-CCNC: 15 U/L — SIGNIFICANT CHANGE UP (ref 10–40)
BILIRUB SERPL-MCNC: 0.5 MG/DL — SIGNIFICANT CHANGE UP (ref 0.2–1.2)
BUN SERPL-MCNC: 11 MG/DL — SIGNIFICANT CHANGE UP (ref 7–23)
BUN SERPL-MCNC: 12 MG/DL — SIGNIFICANT CHANGE UP (ref 7–23)
CALCIUM SERPL-MCNC: 8.1 MG/DL — LOW (ref 8.4–10.5)
CALCIUM SERPL-MCNC: 8.8 MG/DL — SIGNIFICANT CHANGE UP (ref 8.4–10.5)
CHLORIDE SERPL-SCNC: 107 MMOL/L — SIGNIFICANT CHANGE UP (ref 96–108)
CHLORIDE SERPL-SCNC: 108 MMOL/L — SIGNIFICANT CHANGE UP (ref 96–108)
CO2 SERPL-SCNC: 20 MMOL/L — LOW (ref 22–31)
CO2 SERPL-SCNC: 21 MMOL/L — LOW (ref 22–31)
CREAT SERPL-MCNC: 0.91 MG/DL — SIGNIFICANT CHANGE UP (ref 0.5–1.3)
CREAT SERPL-MCNC: 0.97 MG/DL — SIGNIFICANT CHANGE UP (ref 0.5–1.3)
EGFR: 69 ML/MIN/1.73M2 — SIGNIFICANT CHANGE UP
EGFR: 75 ML/MIN/1.73M2 — SIGNIFICANT CHANGE UP
GLUCOSE SERPL-MCNC: 118 MG/DL — HIGH (ref 70–99)
GLUCOSE SERPL-MCNC: 87 MG/DL — SIGNIFICANT CHANGE UP (ref 70–99)
POTASSIUM SERPL-MCNC: 3.2 MMOL/L — LOW (ref 3.5–5.3)
POTASSIUM SERPL-MCNC: SIGNIFICANT CHANGE UP (ref 3.5–5.3)
POTASSIUM SERPL-SCNC: 3.2 MMOL/L — LOW (ref 3.5–5.3)
POTASSIUM SERPL-SCNC: SIGNIFICANT CHANGE UP (ref 3.5–5.3)
PROT SERPL-MCNC: 6.5 G/DL — SIGNIFICANT CHANGE UP (ref 6–8.3)
SODIUM SERPL-SCNC: 139 MMOL/L — SIGNIFICANT CHANGE UP (ref 135–145)
SODIUM SERPL-SCNC: 139 MMOL/L — SIGNIFICANT CHANGE UP (ref 135–145)

## 2023-05-15 PROCEDURE — 99233 SBSQ HOSP IP/OBS HIGH 50: CPT | Mod: GC

## 2023-05-15 RX ORDER — CLONAZEPAM 1 MG
0.5 TABLET ORAL EVERY 12 HOURS
Refills: 0 | Status: DISCONTINUED | OUTPATIENT
Start: 2023-05-15 | End: 2023-05-16

## 2023-05-15 RX ORDER — POTASSIUM CHLORIDE 20 MEQ
20 PACKET (EA) ORAL
Refills: 0 | Status: COMPLETED | OUTPATIENT
Start: 2023-05-15 | End: 2023-05-15

## 2023-05-15 RX ORDER — PANTOPRAZOLE SODIUM 20 MG/1
40 TABLET, DELAYED RELEASE ORAL
Refills: 0 | Status: DISCONTINUED | OUTPATIENT
Start: 2023-05-15 | End: 2023-05-16

## 2023-05-15 RX ORDER — BUPROPION HYDROCHLORIDE 150 MG/1
200 TABLET, EXTENDED RELEASE ORAL
Refills: 0 | Status: DISCONTINUED | OUTPATIENT
Start: 2023-05-15 | End: 2023-05-16

## 2023-05-15 RX ORDER — POTASSIUM CHLORIDE 20 MEQ
40 PACKET (EA) ORAL ONCE
Refills: 0 | Status: COMPLETED | OUTPATIENT
Start: 2023-05-15 | End: 2023-05-15

## 2023-05-15 RX ADMIN — BUPROPION HYDROCHLORIDE 200 MILLIGRAM(S): 150 TABLET, EXTENDED RELEASE ORAL at 13:01

## 2023-05-15 RX ADMIN — SERTRALINE 75 MILLIGRAM(S): 25 TABLET, FILM COATED ORAL at 12:15

## 2023-05-15 RX ADMIN — Medication 10 MILLIGRAM(S): at 01:33

## 2023-05-15 RX ADMIN — Medication 50 MILLIEQUIVALENT(S): at 21:45

## 2023-05-15 RX ADMIN — Medication 75 MILLIGRAM(S): at 22:38

## 2023-05-15 RX ADMIN — ENOXAPARIN SODIUM 40 MILLIGRAM(S): 100 INJECTION SUBCUTANEOUS at 06:38

## 2023-05-15 RX ADMIN — Medication 50 MILLIEQUIVALENT(S): at 23:53

## 2023-05-15 RX ADMIN — Medication 40 MILLIEQUIVALENT(S): at 21:44

## 2023-05-15 RX ADMIN — ARIPIPRAZOLE 5 MILLIGRAM(S): 15 TABLET ORAL at 13:01

## 2023-05-15 RX ADMIN — ENOXAPARIN SODIUM 40 MILLIGRAM(S): 100 INJECTION SUBCUTANEOUS at 17:59

## 2023-05-15 RX ADMIN — Medication 10 MILLIGRAM(S): at 06:37

## 2023-05-15 RX ADMIN — Medication 0.5 MILLIGRAM(S): at 12:15

## 2023-05-15 NOTE — PROGRESS NOTE ADULT - PROBLEM SELECTOR PLAN 5
Fluids: LR @100cc/hr  Electrolytes: Mg>2, K>4  Nutrition: replete lytes PRN  Prophylaxis: lovenox  Activity: AAT, OOBTC  GI: PPI  C: FC  Dispo: Admit to [facility] Fluids: none  Electrolytes: Mg>2, K>4  Nutrition: replete lytes PRN  Prophylaxis: lovenox  Activity: AAT, OOBTC  GI: PPI  C: FC  Dispo: Admit to [facility]

## 2023-05-15 NOTE — PROGRESS NOTE ADULT - SUBJECTIVE AND OBJECTIVE BOX
**Incomplete Note**   OVERNIGHT EVENTS: No acute events overnight.     SUBJECTIVE:  Patient seen and examined at bedside. Patient was not in acute distress this morning, hasn't vomited since her emergency room visit. Patient mentioned that her recurrent vomiting episodes improve with hot showers, and was asking when she would be able to wash herself. Endorses improved abdominal pain and mild headache, but is not complaining of diarrhea, constipation, lightheadedness, dizziness, chest pain, shortness of breath, fevers or chills.     Vital Signs Last 12 Hrs  T(F): 98 (05-15-23 @ 06:24), Max: 98 (05-15-23 @ 06:24)  HR: 68 (05-15-23 @ 06:24) (68 - 68)  BP: 107/61 (05-15-23 @ 06:24) (107/61 - 107/61)  BP(mean): --  RR: 18 (05-15-23 @ 06:24) (18 - 18)  SpO2: 99% (05-15-23 @ 06:24) (99% - 99%)  I&O's Summary      PHYSICAL EXAM:  Constitutional: NAD, comfortable in bed, somewhat lethargic.   HEENT: NC/AT, PERRLA, EOMI, no conjunctival pallor or scleral icterus, MMM  Respiratory: CTA B/L. No w/r/r.   Cardiovascular: RRR, normal S1 and S2, no m/r/g.   Gastrointestinal: +BS, soft NTND, mild tenderness to palpation in the epigastric region, no guarding or rebound tenderness, no palpable masses   Extremities: wwp; no cyanosis, clubbing or edema.   Vascular: Pulses equal and strong throughout.   Neurological: AAOx3, no CN deficits.          LABS:                        14.3   22.42 )-----------( 446      ( 14 May 2023 09:35 )             43.2     05    140  |  106  |  18  ----------------------------<  171<H>  4.3   |  21<L>  |  1.03    Ca    9.5      14 May 2023 06:24    TPro  7.6  /  Alb  4.6  /  TBili  0.3  /  DBili  x   /  AST  15  /  ALT  16  /  AlkPhos  68  05-      Urinalysis Basic - ( 14 May 2023 07:08 )    Color: Yellow / Appearance: Clear / S.020 / pH: x  Gluc: x / Ketone: NEGATIVE  / Bili: Negative / Urobili: 0.2 E.U./dL   Blood: x / Protein: Trace mg/dL / Nitrite: NEGATIVE   Leuk Esterase: NEGATIVE / RBC: < 5 /HPF / WBC < 5 /HPF   Sq Epi: x / Non Sq Epi: x / Bacteria: Present /HPF          RADIOLOGY & ADDITIONAL TESTS:   from: CT Abdomen and Pelvis w/ IV Cont (23 @ 07:32)   No acute findings.  Status post cholecystectomy.  Splenic tissue in the left upper quadrant likely splenosis..        MEDICATIONS  (STANDING):  ARIPiprazole 5 milliGRAM(s) Oral daily  buPROPion XL (24-Hour) . 300 milliGRAM(s) Oral daily  enoxaparin Injectable 40 milliGRAM(s) SubCutaneous every 12 hours  metoclopramide Injectable 10 milliGRAM(s) IV Push every 6 hours  pantoprazole    Tablet 40 milliGRAM(s) Oral before breakfast  sertraline 75 milliGRAM(s) Oral daily  topiramate 75 milliGRAM(s) Oral at bedtime    MEDICATIONS  (PRN):  LORazepam   Injectable 0.5 milliGRAM(s) IV Push two times a day PRN Nausea and/or Vomiting

## 2023-05-15 NOTE — PROGRESS NOTE ADULT - ATTENDING COMMENTS
Patient reports feeling clinically improved, requesting discharge. Abdominal pain resolved, no nausea, no vomiting. Hasn't tried po intake ye. Reports her symptoms resolve with hot showers. Denies fevers, no chills, no diarrhea, having BMs. Reports chronic WBC elevation, being followed by Hematology as outpatient. No other complaints or events reported.  General: AOX3, NAD, lying flat in bed, speaking in full sentences, no labored breathing on RA  HEENT: AT/NC, no facial asymmetry  Lungs: no crackles, no wheezes  Heart: RRR  Abdomen: soft, non-tender, no guarding, + BS, negative Rivera  Extremities: warm, no edema, no calf tenderness, no focal deficit    Labs reviewed    Patient reports resolution of symptoms with symptomatic management , attempting PO intake. Get CHEM, advance diet as tolerated   Patient reports chronic leucocytosis, afebrile, CT without acute findings. Denies any other symptoms. Continue on monitoring  DVT ppx

## 2023-05-15 NOTE — PROGRESS NOTE ADULT - PROBLEM SELECTOR PLAN 1
patient p/w 1 day of epigastric abdominal pain. n/v (nbnb) in ability to hold down solids or liquids. Patient endorses long hx of cyclic vomiting syndrome. last episode july 2022. patient states she was hospitalized 13 times that year for CVS. gets relief w reglan, compazine and haldol. CT abd/pel in ED w/o acute findings. no signs of pancreatitis, SBO or inflammation. lipase wnl. s/p haldol 2.5 x3, Zofran 4mg, compazine and 2L NS in ED  -c/w maintenance fluids 100cc/hr LR  -Zofran 4mg q6 (ordered)  -reglan 10mg q8 (ordered)  -benadryl 25-50mg (oral or IVPB) every 6 to 8 hours as needed   -can try ativan 0.5-1mg IVBP or PO q12 as needed    patient is on many psychotropic medications - will avoid using haldol patient p/w 1 day of epigastric abdominal pain. n/v (nbnb) in ability to hold down solids or liquids. Patient endorses long hx of cyclic vomiting syndrome. last episode July 2022. patient states she was hospitalized 13 times that year for CVS. gets relief w reglan, compazine and haldol. CT abd/pel in ED w/o acute findings. no signs of pancreatitis, SBO or inflammation. lipase wnl. s/p haldol 2.5 x3, Zofran 4mg, compazine and 2L NS in ED. Cyclic vomiting syndrome vs cannabis hyperemesis syndrome.   -c/w maintenance fluids 100cc/hr LR  -Patient education regarding regular cannabis use  -reglan 10mg q8 (ordered)  -benadryl 25-50mg (oral or IVPB) every 6 to 8 hours as needed   -can try ativan 0.5-1mg IVBP or PO q12 as needed  -Advance diet to clear liquid   -patient is on many psychotropic medications - will avoid using haldol patient p/w 1 day of epigastric abdominal pain. n/v (nbnb) in ability to hold down solids or liquids. Patient endorses long hx of cyclic vomiting syndrome. last episode July 2022. patient states she was hospitalized 13 times that year for CVS. gets relief w reglan, compazine and haldol. CT abd/pel in ED w/o acute findings. no signs of pancreatitis, SBO or inflammation. lipase wnl. s/p haldol 2.5 x3, Zofran 4mg, compazine and 2L NS in ED. Cyclic vomiting syndrome vs cannabis hyperemesis syndrome. S/p maintenance fluids 100cc/hr LR o/n  - dced fluds, monitor on CLD  - dced reglan 10mg q8  - patient education regarding regular cannabis use  - can try ativan 0.5-1mg IVBP or PO q12 as needed for nausea/vomiting  - patient is on many psychotropic medications - will avoid using haldol

## 2023-05-15 NOTE — PROGRESS NOTE ADULT - PROBLEM SELECTOR PLAN 2
patient w hx of leukocytosis (baseline 16). was told by pcp and hematologist leukocytosis and thrombocytosis is 2/2 to splenectomy. ROS unremarkable for infectious etiology. worsened leokocytosis likely combination of reactive leukocytosis and dehydration.  -cont to monitor off antibiotics patient w hx of leukocytosis (baseline 16). was told by pcp and hematologist leukocytosis and thrombocytosis is 2/2 to splenectomy. ROS unremarkable for infectious etiology. worsened leukocytosis likely combination of reactive leukocytosis and dehydration.  -cont to monitor off antibiotics Patient w hx of leukocytosis (baseline 16). 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 ROS unremarkable for infectious etiology. Increased leukocytosis likely combination of reactive leukocytosis and dehydration.  - cont to monitor off antibiotics

## 2023-05-15 NOTE — PROGRESS NOTE ADULT - PROBLEM SELECTOR PLAN 4
hx of bipolar disorder. complaint w meds. symptoms under control at this time. missed meds today due to in ability to obtain PO. home meds: Aripiprazole 5mg qd, buproprion 200mg BID, clonazepam 0.5 BID, topiramate 75mg at bedtime, sertraline 75mg am  -c/w home meds hx of bipolar disorder. complaint w meds. symptoms under control at this time. missed meds today due to in ability to obtain PO. home meds: Aripiprazole 5mg qd, buproprion 200mg BID, clonazepam 0.5 BID, topiramate 75mg at bedtime, sertraline 75mg am  - c/w home meds

## 2023-05-16 VITALS
SYSTOLIC BLOOD PRESSURE: 108 MMHG | HEART RATE: 70 BPM | TEMPERATURE: 98 F | DIASTOLIC BLOOD PRESSURE: 65 MMHG | OXYGEN SATURATION: 98 % | RESPIRATION RATE: 18 BRPM

## 2023-05-16 LAB
ALBUMIN SERPL ELPH-MCNC: 3.6 G/DL — SIGNIFICANT CHANGE UP (ref 3.3–5)
ALP SERPL-CCNC: 56 U/L — SIGNIFICANT CHANGE UP (ref 40–120)
ALT FLD-CCNC: 13 U/L — SIGNIFICANT CHANGE UP (ref 10–45)
ANION GAP SERPL CALC-SCNC: 10 MMOL/L — SIGNIFICANT CHANGE UP (ref 5–17)
ANISOCYTOSIS BLD QL: SLIGHT — SIGNIFICANT CHANGE UP
AST SERPL-CCNC: 15 U/L — SIGNIFICANT CHANGE UP (ref 10–40)
BASOPHILS # BLD AUTO: 0 K/UL — SIGNIFICANT CHANGE UP (ref 0–0.2)
BASOPHILS NFR BLD AUTO: 0 % — SIGNIFICANT CHANGE UP (ref 0–2)
BILIRUB SERPL-MCNC: 0.6 MG/DL — SIGNIFICANT CHANGE UP (ref 0.2–1.2)
BUN SERPL-MCNC: 9 MG/DL — SIGNIFICANT CHANGE UP (ref 7–23)
BURR CELLS BLD QL SMEAR: PRESENT — SIGNIFICANT CHANGE UP
CALCIUM SERPL-MCNC: 9 MG/DL — SIGNIFICANT CHANGE UP (ref 8.4–10.5)
CHLORIDE SERPL-SCNC: 108 MMOL/L — SIGNIFICANT CHANGE UP (ref 96–108)
CO2 SERPL-SCNC: 20 MMOL/L — LOW (ref 22–31)
CREAT SERPL-MCNC: 0.95 MG/DL — SIGNIFICANT CHANGE UP (ref 0.5–1.3)
EGFR: 71 ML/MIN/1.73M2 — SIGNIFICANT CHANGE UP
EOSINOPHIL # BLD AUTO: 0.51 K/UL — HIGH (ref 0–0.5)
EOSINOPHIL NFR BLD AUTO: 3.6 % — SIGNIFICANT CHANGE UP (ref 0–6)
GLUCOSE SERPL-MCNC: 91 MG/DL — SIGNIFICANT CHANGE UP (ref 70–99)
HCT VFR BLD CALC: 41.2 % — SIGNIFICANT CHANGE UP (ref 34.5–45)
HGB BLD-MCNC: 13.2 G/DL — SIGNIFICANT CHANGE UP (ref 11.5–15.5)
LYMPHOCYTES # BLD AUTO: 29.7 % — SIGNIFICANT CHANGE UP (ref 13–44)
LYMPHOCYTES # BLD AUTO: 4.25 K/UL — HIGH (ref 1–3.3)
MACROCYTES BLD QL: SLIGHT — SIGNIFICANT CHANGE UP
MAGNESIUM SERPL-MCNC: 2 MG/DL — SIGNIFICANT CHANGE UP (ref 1.6–2.6)
MANUAL SMEAR VERIFICATION: SIGNIFICANT CHANGE UP
MCHC RBC-ENTMCNC: 30.6 PG — SIGNIFICANT CHANGE UP (ref 27–34)
MCHC RBC-ENTMCNC: 32 GM/DL — SIGNIFICANT CHANGE UP (ref 32–36)
MCV RBC AUTO: 95.4 FL — SIGNIFICANT CHANGE UP (ref 80–100)
MICROCYTES BLD QL: SLIGHT — SIGNIFICANT CHANGE UP
MONOCYTES # BLD AUTO: 0.51 K/UL — SIGNIFICANT CHANGE UP (ref 0–0.9)
MONOCYTES NFR BLD AUTO: 3.6 % — SIGNIFICANT CHANGE UP (ref 2–14)
NEUTROPHILS # BLD AUTO: 9.02 K/UL — HIGH (ref 1.8–7.4)
NEUTROPHILS NFR BLD AUTO: 63.1 % — SIGNIFICANT CHANGE UP (ref 43–77)
PHOSPHATE SERPL-MCNC: 2.8 MG/DL — SIGNIFICANT CHANGE UP (ref 2.5–4.5)
PLAT MORPH BLD: NORMAL — SIGNIFICANT CHANGE UP
PLATELET # BLD AUTO: 433 K/UL — HIGH (ref 150–400)
POIKILOCYTOSIS BLD QL AUTO: SLIGHT — SIGNIFICANT CHANGE UP
POTASSIUM SERPL-MCNC: 3.6 MMOL/L — SIGNIFICANT CHANGE UP (ref 3.5–5.3)
POTASSIUM SERPL-SCNC: 3.6 MMOL/L — SIGNIFICANT CHANGE UP (ref 3.5–5.3)
PROT SERPL-MCNC: 6.2 G/DL — SIGNIFICANT CHANGE UP (ref 6–8.3)
RBC # BLD: 4.32 M/UL — SIGNIFICANT CHANGE UP (ref 3.8–5.2)
RBC # FLD: 14.5 % — SIGNIFICANT CHANGE UP (ref 10.3–14.5)
RBC BLD AUTO: ABNORMAL
SMUDGE CELLS # BLD: PRESENT — SIGNIFICANT CHANGE UP
SODIUM SERPL-SCNC: 138 MMOL/L — SIGNIFICANT CHANGE UP (ref 135–145)
WBC # BLD: 14.3 K/UL — HIGH (ref 3.8–10.5)
WBC # FLD AUTO: 14.3 K/UL — HIGH (ref 3.8–10.5)

## 2023-05-16 PROCEDURE — 99239 HOSP IP/OBS DSCHRG MGMT >30: CPT

## 2023-05-16 PROCEDURE — 83690 ASSAY OF LIPASE: CPT

## 2023-05-16 PROCEDURE — 85025 COMPLETE CBC W/AUTO DIFF WBC: CPT

## 2023-05-16 PROCEDURE — 84702 CHORIONIC GONADOTROPIN TEST: CPT

## 2023-05-16 PROCEDURE — 83735 ASSAY OF MAGNESIUM: CPT

## 2023-05-16 PROCEDURE — 84100 ASSAY OF PHOSPHORUS: CPT

## 2023-05-16 PROCEDURE — 81001 URINALYSIS AUTO W/SCOPE: CPT

## 2023-05-16 PROCEDURE — 74177 CT ABD & PELVIS W/CONTRAST: CPT | Mod: MA

## 2023-05-16 PROCEDURE — 80053 COMPREHEN METABOLIC PANEL: CPT

## 2023-05-16 PROCEDURE — 96376 TX/PRO/DX INJ SAME DRUG ADON: CPT

## 2023-05-16 PROCEDURE — 96374 THER/PROPH/DIAG INJ IV PUSH: CPT

## 2023-05-16 PROCEDURE — 36415 COLL VENOUS BLD VENIPUNCTURE: CPT

## 2023-05-16 PROCEDURE — 80048 BASIC METABOLIC PNL TOTAL CA: CPT

## 2023-05-16 PROCEDURE — 99285 EMERGENCY DEPT VISIT HI MDM: CPT | Mod: 25

## 2023-05-16 PROCEDURE — 96375 TX/PRO/DX INJ NEW DRUG ADDON: CPT

## 2023-05-16 RX ORDER — LURASIDONE HYDROCHLORIDE 40 MG/1
1 TABLET ORAL
Refills: 0 | DISCHARGE

## 2023-05-16 RX ORDER — ARIPIPRAZOLE 15 MG/1
0 TABLET ORAL
Qty: 0 | Refills: 1 | DISCHARGE

## 2023-05-16 RX ORDER — CLONAZEPAM 1 MG
2.5 TABLET ORAL
Qty: 0 | Refills: 0 | DISCHARGE

## 2023-05-16 RX ORDER — SERTRALINE 25 MG/1
1 TABLET, FILM COATED ORAL
Qty: 0 | Refills: 0 | DISCHARGE

## 2023-05-16 RX ORDER — POTASSIUM CHLORIDE 20 MEQ
40 PACKET (EA) ORAL ONCE
Refills: 0 | Status: COMPLETED | OUTPATIENT
Start: 2023-05-16 | End: 2023-05-16

## 2023-05-16 RX ORDER — TOPIRAMATE 25 MG
1 TABLET ORAL
Qty: 0 | Refills: 0 | DISCHARGE

## 2023-05-16 RX ORDER — CLONAZEPAM 1 MG
0.5 TABLET ORAL ONCE
Refills: 0 | Status: DISCONTINUED | OUTPATIENT
Start: 2023-05-16 | End: 2023-05-16

## 2023-05-16 RX ORDER — ARIPIPRAZOLE 15 MG/1
1 TABLET ORAL
Qty: 0 | Refills: 0 | DISCHARGE
Start: 2023-05-16

## 2023-05-16 RX ORDER — BUPROPION HYDROCHLORIDE 150 MG/1
1 TABLET, EXTENDED RELEASE ORAL
Qty: 0 | Refills: 1 | DISCHARGE

## 2023-05-16 RX ADMIN — BUPROPION HYDROCHLORIDE 200 MILLIGRAM(S): 150 TABLET, EXTENDED RELEASE ORAL at 00:11

## 2023-05-16 RX ADMIN — Medication 10 MILLIGRAM(S): at 00:12

## 2023-05-16 RX ADMIN — Medication 0.5 MILLIGRAM(S): at 00:28

## 2023-05-16 RX ADMIN — Medication 0.5 MILLIGRAM(S): at 09:38

## 2023-05-16 RX ADMIN — Medication 40 MILLIEQUIVALENT(S): at 09:39

## 2023-05-16 RX ADMIN — PANTOPRAZOLE SODIUM 40 MILLIGRAM(S): 20 TABLET, DELAYED RELEASE ORAL at 06:36

## 2023-05-16 RX ADMIN — ENOXAPARIN SODIUM 40 MILLIGRAM(S): 100 INJECTION SUBCUTANEOUS at 06:37

## 2023-05-16 NOTE — DISCHARGE NOTE NURSING/CASE MANAGEMENT/SOCIAL WORK - PATIENT PORTAL LINK FT
You can access the FollowMyHealth Patient Portal offered by [facility] by registering at the following website: [email or web address]. By joining [facility] [facility]’s FollowMyHealth portal, you will also be able to view your health information using other applications (apps) compatible with our system.

## 2023-05-16 NOTE — PROGRESS NOTE ADULT - PROBLEM SELECTOR PLAN 1
patient p/w 1 day of epigastric abdominal pain. n/v (nbnb) in ability to hold down solids or liquids. Patient endorses long hx of cyclic vomiting syndrome. last episode July 2022. patient states she was hospitalized 13 times that year for CVS. gets relief w reglan, compazine and haldol. CT abd/pel in ED w/o acute findings. no signs of pancreatitis, SBO or inflammation. lipase wnl. s/p haldol 2.5 x3, Zofran 4mg, compazine and 2L NS in ED. Cyclic vomiting syndrome vs cannabis hyperemesis syndrome. S/p maintenance fluids 100cc/hr LR o/n  - dced fluds, monitor on CLD  - dced reglan 10mg q8  - patient education regarding regular cannabis use  - can try ativan 0.5-1mg IVBP or PO q12 as needed for nausea/vomiting  - patient is on many psychotropic medications - will avoid using haldol patient p/w 1 day of epigastric abdominal pain. n/v (nbnb) in ability to hold down solids or liquids. Patient endorses long hx of cyclic vomiting syndrome. last episode July 2022. patient states she was hospitalized 13 times that year for CVS. gets relief w reglan, compazine and haldol. CT abd/pel in ED w/o acute findings. no signs of pancreatitis, SBO or inflammation. lipase wnl. s/p haldol 2.5 x3, Zofran 4mg, compazine and 2L NS in ED. Cyclic vomiting syndrome vs cannabis hyperemesis syndrome. S/p maintenance fluids 100cc/hr LR o/n.   - N/v resolved

## 2023-05-16 NOTE — DISCHARGE NOTE PROVIDER - NSDCMRMEDTOKEN_GEN_ALL_CORE_FT
ARIPIPRAZOLE 5MG TABLETS: TAKE 1 TABLET BY MOUTH DAILY  BUPROPION SR 200MG TABLETS (12 HR): TAKE 1 TABLET BY MOUTH TWICE DAILY  CLONAZEPAM 0.5MG TABLETS: TAKE 2 AND 1/2 TABLET BY MOUTH ONCE DAILY  SERTRALINE HCL  50 MG TABS:   TOPIRAMATE 25MG TABLETS: TAKE 1 TABLET BY MOUTH THREE TIMES DAILY   ARIPiprazole 5 mg oral tablet: 1 tab(s) orally once a day  BUPROPION SR 200MG TABLETS (12 HR): TAKE 1 TABLET BY MOUTH TWICE DAILY  CLONAZEPAM 0.5MG TABLETS: TAKE 2 AND 1/2 TABLET BY MOUTH ONCE DAILY  Latuda 40 mg oral tablet: 1 tab(s) orally once a day (at bedtime)  SERTRALINE HCL  50 MG TABS:   TOPIRAMATE 25MG TABLETS: TAKE 1 TABLET BY MOUTH THREE TIMES DAILY

## 2023-05-16 NOTE — DISCHARGE NOTE PROVIDER - CARE PROVIDER_API CALL
ANISH FITZGERALD  Internal Medicine  [address], [address]  [place] [address]  Phone: ([phone]  Fax: ()-  Established Patient  Follow Up Time: 2 weeks    Nickie Willard  Address: [address] [place] [address], [place] [address]  Phone: [phone]  Phone: [phone]  Fax: (   )    -  Established Patient  Follow Up Time:     Catina [address] [place] [address].  [phone]. [phone]  Phone: [phone]  Fax: (   )    -  Established Patient  Follow Up Time: 1 week   ANISH FITZGREALD  Internal Medicine  [address] [place] [address], [address]  [place] [address]  Phone: ([phone]  Fax: ()-  Established Patient  Follow Up Time: 2 weeks    [address] [place] [address].  [phone]. [phone]  Phone: [phone]  Fax: (   )    -  Established Patient  Follow Up Time: 1 week    Rea Willard  Address: [address] [place] [address] [address], [place] [address]  Phone: [phone]  Phone: [phone]  Fax: (   )    -  Established Patient  Follow Up Time: 1 week

## 2023-05-16 NOTE — PROGRESS NOTE ADULT - PROBLEM SELECTOR PLAN 5
Fluids: none  Electrolytes: Mg>2, K>4  Nutrition: replete lytes PRN  Prophylaxis: lovenox  Activity: AAT, OOBTC  GI: PPI  C: FC  Dispo: Admit to [facility]

## 2023-05-16 NOTE — DISCHARGE NOTE PROVIDER - HOSPITAL COURSE
#Discharge: do not delete    Patient is __ yo M/F with past medical history of _____  Presented with _____, found to have _____  Problem List/Main Diagnoses (problem-based):   Inpatient treatment course:   Patient was discharged to:  New medications:   Changes to old medications:   Items to Follow up as outpatient   Physical exam at time of discharge: #Discharge: do not delete    52 F pmh IBS, cyclical vomiting, bipolar d/o, psh cholecystectomy and splenectomy bibems c/o abd pain and NV and inability to obtain PO    Problem List/Main Diagnoses (problem-based):     # Abdominal pain.   ·  Plan: patient p/w 1 day of epigastric abdominal pain. n/v (nbnb) in ability to hold down solids or liquids. Patient endorses long hx of cyclic vomiting syndrome. last episode July 2022. patient states she was hospitalized 13 times that year for CVS. gets relief w reglan, compazine and haldol. CT abd/pel in ED w/o acute findings. no signs of pancreatitis, SBO or inflammation. lipase wnl. s/p haldol 2.5 x3, Zofran 4mg, compazine and 2L NS in ED. Cyclic vomiting syndrome vs cannabis hyperemesis syndrome. S/p maintenance fluids 100cc/hr LR o/n  - dced fluds, monitor on CLD  - dced reglan 10mg q8  - patient education regarding regular cannabis use    # Leukocytosis.   ·  Plan: Patient w hx of leukocytosis (baseline 16). 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 ROS unremarkable for infectious etiology. Increased leukocytosis likely combination of reactive leukocytosis and dehydration.  - cont to monitor off antibiotics.    # Thrombocytosis.   ·  Plan: - plan as above.    # Bipolar disorder.   ·  Plan: hx of bipolar disorder. complaint w meds. symptoms under control at this time. missed meds today due to in ability to obtain PO. home meds: Aripiprazole 5mg qd, buproprion 200mg BID, clonazepam 0.5 BID, topiramate 75mg at bedtime, sertraline 75mg am  - c/w home meds.        Patient was discharged to: home  New medications: none  Changes to old medications: none   Items to Follow up as outpatient: PCP follow up   #Discharge: do not delete    52 F pmh IBS, cyclical vomiting, bipolar d/o, psh cholecystectomy and splenectomy bibems c/o abd pain and NV and inability to obtain PO    Problem List/Main Diagnoses (problem-based):     # Abdominal pain.   # Cannabinoid hyperemesis syndrome  ·  Plan: patient p/w 1 day of epigastric abdominal pain. n/v (nbnb) in ability to hold down solids or liquids. Patient endorses long hx of cyclic vomiting syndrome. last episode July 2022. patient states she was hospitalized 13 times that year for CVS. gets relief w reglan, compazine and haldol. CT abd/pel in ED w/o acute findings. no signs of pancreatitis, SBO or inflammation. lipase wnl. s/p haldol 2.5 x3, Zofran 4mg, compazine and 2L NS in ED. Cyclic vomiting syndrome vs cannabis hyperemesis syndrome. S/p maintenance fluids 100cc/hr LR o/n. Dced fluids, monitor on CLD. Patient received reglan 10mg q8 but felt that it was not helping her nausea. States nausea improved with hot showers. Patient education regarding regular cannabis use  and relation to hyperemesis syndrome.     # Leukocytosis.   ·  Plan: Patient w hx of leukocytosis (baseline 16). 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 ROS unremarkable for infectious etiology. Increased leukocytosis likely combination of reactive leukocytosis and dehydration. Patient monitored off antibiotics white count decreased to 14.    # Thrombocytosis.   ·  Plan: - plan as above.    # Bipolar disorder.   ·  Plan: hx of bipolar disorder. complaint w meds. symptoms under control at this time. missed meds today due to in ability to obtain PO. home meds: aripiprazole 5mg qd, buproprion 200mg BID, clonazepam 0.5 BID, topiramate 75mg at bedtime, sertraline 75mg am. Patient reported also taking latuda 40mg at night, but did not receive in hospital.        Patient was discharged to: home  New medications: none  Changes to old medications: none   Items to Follow up as outpatient: PCP follow up   #Discharge: do not delete    52 F pmh IBS, cyclical vomiting, bipolar d/o, psh cholecystectomy and splenectomy bibems c/o abd pain and NV and inability to obtain PO.    Problem List/Main Diagnoses (problem-based):     # Abdominal pain.   # Cannabinoid hyperemesis syndrome  ·  Plan: patient p/w 1 day of epigastric abdominal pain. n/v (nbnb) in ability to hold down solids or liquids. Patient endorses long hx of cyclic vomiting syndrome. last episode July 2022. patient states she was hospitalized 13 times that year for CVS. gets relief w reglan, compazine and haldol. CT abd/pel in ED w/o acute findings. no signs of pancreatitis, SBO or inflammation. lipase wnl. s/p haldol 2.5 x3, Zofran 4mg, compazine and 2L NS in ED. Cyclic vomiting syndrome vs cannabis hyperemesis syndrome. S/p maintenance fluids 100cc/hr LR o/n. Dced fluids, monitor on CLD. Patient received reglan 10mg q8 but felt that it was not helping her nausea. States nausea improved with hot showers. Patient education regarding regular cannabis use  and relation to hyperemesis syndrome.     # Leukocytosis.   ·  Plan: Patient w hx of leukocytosis (baseline 16). 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 ROS unremarkable for infectious etiology. Increased leukocytosis likely combination of reactive leukocytosis and dehydration. Patient monitored off antibiotics white count decreased to 14.    # Thrombocytosis.   ·  Plan: - plan as above.    # Bipolar disorder.   ·  Plan: hx of bipolar disorder. complaint w meds. symptoms under control at this time. missed meds today due to in ability to obtain PO. home meds: aripiprazole 5mg qd, buproprion 200mg BID, clonazepam 0.5 BID, topiramate 75mg at bedtime, sertraline 75mg am. Patient reported also taking latuda 40mg at night, but did not receive in hospital.        Patient was discharged to: home  New medications: none  Changes to old medications: none   Items to Follow up as outpatient: PCP follow up

## 2023-05-16 NOTE — PROGRESS NOTE ADULT - SUBJECTIVE AND OBJECTIVE BOX
**Incomplete Note**   OVERNIGHT EVENTS: No acute events overnight.     SUBJECTIVE:  Patient seen and examined at bedside. Patient is no longer complaining of nausea, vomiting, or epigastric pain. Tolerating PO diet well. Passing regular bowel movements. Patient is not complaining of muscle cramps, chest pain, shortness of breath or dizziness.      Vital Signs Last 12 Hrs  T(F): 97.9 (05-16-23 @ 05:36), Max: 97.9 (05-16-23 @ 05:36)  HR: 62 (05-16-23 @ 05:36) (62 - 62)  BP: 111/71 (05-16-23 @ 05:36) (111/71 - 111/71)  BP(mean): --  RR: --  SpO2: 97% (05-16-23 @ 05:36) (97% - 97%)  I&O's Summary      PHYSICAL EXAM:  Constitutional: NAD, comfortable in bed.  Respiratory: CTA B/L. No w/r/r.   Cardiovascular: RRR, normal S1 and S2, no m/r/g.   Gastrointestinal: +BS, soft NTND, no guarding or rebound tenderness, no palpable masses   Neurological: AAOx3           LABS:                        13.2   14.30 )-----------( 433      ( 16 May 2023 07:39 )             41.2     05-16    138  |  108  |  9   ----------------------------<  91  3.6   |  20<L>  |  0.95    Ca    9.0      16 May 2023 07:38  Phos  2.8     05-16  Mg     2.0     05-16    TPro  6.2  /  Alb  3.6  /  TBili  0.6  /  DBili  x   /  AST  15  /  ALT  13  /  AlkPhos  56  05-16            RADIOLOGY & ADDITIONAL TESTS:    MEDICATIONS  (STANDING):  ARIPiprazole 5 milliGRAM(s) Oral daily  buPROPion SR (12-Hour) 200 milliGRAM(s) Oral <User Schedule>  enoxaparin Injectable 40 milliGRAM(s) SubCutaneous every 12 hours  metoclopramide Injectable 10 milliGRAM(s) IV Push every 6 hours  pantoprazole    Tablet 40 milliGRAM(s) Oral before breakfast  sertraline 75 milliGRAM(s) Oral daily  topiramate 75 milliGRAM(s) Oral at bedtime    MEDICATIONS  (PRN):  clonazePAM  Tablet 0.5 milliGRAM(s) Oral every 12 hours PRN anxiety

## 2023-05-16 NOTE — PROGRESS NOTE ADULT - ASSESSMENT
52 F pmh IBS, cyclical vomiting, bipolar d/o, psh cholecystectomy and splenectomy bibems c/o abd pain and NV and inability to obtain PO 52 F pmh IBS, cyclical vomiting, bipolar d/o, psh cholecystectomy and splenectomy bibems c/o abd pain and NV and inability to obtain PO, now resolved.

## 2023-05-16 NOTE — DISCHARGE NOTE PROVIDER - PROVIDER TOKENS
PROVIDER:[TOKEN:[[id number]],FOLLOWUP:[2 weeks],ESTABLISHEDPATIENT:[T]],FREE:[LAST:[Montse],FIRST:[Nickie],PHONE:[phone]],FAX:[(   )    -],ADDRESS:[Address: [address] [place] [address] [address], [place] [address]  Phone: [phone]],ESTABLISHEDPATIENT:[T]],FREE:[LAST:[Molly],FIRST:[Catina],PHONE:[phone]],FAX:[(   )    -],ADDRESS:[[address] [place] [address] [address] [place] [address].  [phone]. [phone]],FOLLOWUP:[1 week],ESTABLISHEDPATIENT:[T]] PROVIDER:[TOKEN:[[id number]],FOLLOWUP:[2 weeks],ESTABLISHEDPATIENT:[T]],FREE:[LAST:[Molly],FIRST:[Catina],PHONE:[phone]],FAX:[(   )    -],ADDRESS:[[address] [place] [address] [address] [place] [address].  [phone]. Fax+[phone]],FOLLOWUP:[1 week],ESTABLISHEDPATIENT:[T]],FREE:[LAST:[Montse],FIRST:[Rea],PHONE:[phone]],FAX:[(   )    -],ADDRESS:[Address: [address] [place] [address] [address], [place] [address]  Phone: [phone]],FOLLOWUP:[1 week],ESTABLISHEDPATIENT:[T]]

## 2023-05-16 NOTE — PROGRESS NOTE ADULT - PROBLEM SELECTOR PLAN 2
Patient w hx of leukocytosis (baseline 16). 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 ROS unremarkable for infectious etiology. Increased leukocytosis likely combination of reactive leukocytosis and dehydration.  - cont to monitor off antibiotics Patient w hx of leukocytosis (baseline 16). 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 ROS unremarkable for infectious etiology. Increased leukocytosis likely combination of reactive leukocytosis and dehydration.  - cont to monitor off antibiotics  - White cell count returned to baseline

## 2023-05-16 NOTE — DISCHARGE NOTE NURSING/CASE MANAGEMENT/SOCIAL WORK - NSDCPEFALRISK_GEN_ALL_CORE
For information on Fall & Injury Prevention, visit: [email or web address] OR  [email or web address] OR  https://www.cdc.gov/steadi/patient.html

## 2023-05-16 NOTE — PROGRESS NOTE ADULT - PROBLEM SELECTOR PLAN 4
hx of bipolar disorder. complaint w meds. symptoms under control at this time. missed meds today due to in ability to obtain PO. home meds: Aripiprazole 5mg qd, buproprion 200mg BID, clonazepam 0.5 BID, topiramate 75mg at bedtime, sertraline 75mg am  - c/w home meds

## 2023-05-16 NOTE — DISCHARGE NOTE PROVIDER - ATTENDING DISCHARGE PHYSICAL EXAMINATION:
Patient with resolution of N/V, tolerating diet, requesting discharge. No other complaints or events reported.  General: AOX3, NAD, lying in bed, speaking in full sentences, no labored breathing sergio RA  HEENT: AT/NC, no facial asymmetry  Lungs: no crackles, no wheezes  Abdomen: soft, non-tender, no guarding, + BS, negative Rivera  Extremities: warm, no edema, no tenderness, no calf tenderness, no focal deficit    Labs reviewed    Patient with resolution of symptoms with symptomatic management, tolerating diet.   Chronic leucocytosis improving, PLT stable. Patient reports chronic elevation  Follow-up with PCP

## 2023-05-19 DIAGNOSIS — Z88.1 ALLERGY STATUS TO OTHER ANTIBIOTIC AGENTS STATUS: ICD-10-CM

## 2023-05-19 DIAGNOSIS — D75.838 OTHER THROMBOCYTOSIS: ICD-10-CM

## 2023-05-19 DIAGNOSIS — Z91.09 OTHER ALLERGY STATUS, OTHER THAN TO DRUGS AND BIOLOGICAL SUBSTANCES: ICD-10-CM

## 2023-05-19 DIAGNOSIS — F12.90 CANNABIS USE, UNSPECIFIED, UNCOMPLICATED: ICD-10-CM

## 2023-05-19 DIAGNOSIS — D72.828 OTHER ELEVATED WHITE BLOOD CELL COUNT: ICD-10-CM

## 2023-05-19 DIAGNOSIS — Z79.899 OTHER LONG TERM (CURRENT) DRUG THERAPY: ICD-10-CM

## 2023-05-19 DIAGNOSIS — Z20.822 CONTACT WITH AND (SUSPECTED) EXPOSURE TO COVID-19: ICD-10-CM

## 2023-05-19 DIAGNOSIS — R51.9 HEADACHE, UNSPECIFIED: ICD-10-CM

## 2023-05-19 DIAGNOSIS — E86.0 DEHYDRATION: ICD-10-CM

## 2023-05-19 DIAGNOSIS — Z90.81 ACQUIRED ABSENCE OF SPLEEN: ICD-10-CM

## 2023-05-19 DIAGNOSIS — R11.2 NAUSEA WITH VOMITING, UNSPECIFIED: ICD-10-CM

## 2023-05-19 DIAGNOSIS — Z90.49 ACQUIRED ABSENCE OF OTHER SPECIFIED PARTS OF DIGESTIVE TRACT: ICD-10-CM

## 2023-05-19 DIAGNOSIS — Z88.0 ALLERGY STATUS TO PENICILLIN: ICD-10-CM

## 2023-05-19 DIAGNOSIS — R11.10 VOMITING, UNSPECIFIED: ICD-10-CM

## 2023-05-19 DIAGNOSIS — F31.9 BIPOLAR DISORDER, UNSPECIFIED: ICD-10-CM

## 2024-08-25 VITALS
OXYGEN SATURATION: 98 % | RESPIRATION RATE: 18 BRPM | WEIGHT: 259.93 LBS | TEMPERATURE: 98 F | SYSTOLIC BLOOD PRESSURE: 129 MMHG | HEART RATE: 87 BPM | DIASTOLIC BLOOD PRESSURE: 91 MMHG

## 2024-08-25 LAB
ALBUMIN SERPL ELPH-MCNC: 4.3 G/DL — SIGNIFICANT CHANGE UP (ref 3.3–5)
ALP SERPL-CCNC: 60 U/L — SIGNIFICANT CHANGE UP (ref 40–120)
ALT FLD-CCNC: 18 U/L — SIGNIFICANT CHANGE UP (ref 10–45)
ANION GAP SERPL CALC-SCNC: 12 MMOL/L — SIGNIFICANT CHANGE UP (ref 5–17)
AST SERPL-CCNC: 18 U/L — SIGNIFICANT CHANGE UP (ref 10–40)
BASOPHILS # BLD AUTO: 0.18 K/UL — SIGNIFICANT CHANGE UP (ref 0–0.2)
BASOPHILS NFR BLD AUTO: 0.9 % — SIGNIFICANT CHANGE UP (ref 0–2)
BILIRUB SERPL-MCNC: 1 MG/DL — SIGNIFICANT CHANGE UP (ref 0.2–1.2)
BUN SERPL-MCNC: 14 MG/DL — SIGNIFICANT CHANGE UP (ref 7–23)
CALCIUM SERPL-MCNC: 9.4 MG/DL — SIGNIFICANT CHANGE UP (ref 8.4–10.5)
CHLORIDE SERPL-SCNC: 108 MMOL/L — SIGNIFICANT CHANGE UP (ref 96–108)
CO2 SERPL-SCNC: 24 MMOL/L — SIGNIFICANT CHANGE UP (ref 22–31)
CREAT SERPL-MCNC: 1.01 MG/DL — SIGNIFICANT CHANGE UP (ref 0.5–1.3)
EGFR: 66 ML/MIN/1.73M2 — SIGNIFICANT CHANGE UP
EOSINOPHIL # BLD AUTO: 0.18 K/UL — SIGNIFICANT CHANGE UP (ref 0–0.5)
EOSINOPHIL NFR BLD AUTO: 0.9 % — SIGNIFICANT CHANGE UP (ref 0–6)
GIANT PLATELETS BLD QL SMEAR: PRESENT — SIGNIFICANT CHANGE UP
GLUCOSE SERPL-MCNC: 123 MG/DL — HIGH (ref 70–99)
HCT VFR BLD CALC: 40.9 % — SIGNIFICANT CHANGE UP (ref 34.5–45)
HCT VFR BLD CALC: 41.5 % — SIGNIFICANT CHANGE UP (ref 34.5–45)
HGB BLD-MCNC: 13.6 G/DL — SIGNIFICANT CHANGE UP (ref 11.5–15.5)
HGB BLD-MCNC: 14.2 G/DL — SIGNIFICANT CHANGE UP (ref 11.5–15.5)
LACTATE SERPL-SCNC: 1.3 MMOL/L — SIGNIFICANT CHANGE UP (ref 0.5–2)
LACTATE SERPL-SCNC: 2.2 MMOL/L — HIGH (ref 0.5–2)
LIDOCAIN IGE QN: 51 U/L — SIGNIFICANT CHANGE UP (ref 7–60)
LYMPHOCYTES # BLD AUTO: 23 % — SIGNIFICANT CHANGE UP (ref 13–44)
LYMPHOCYTES # BLD AUTO: 4.56 K/UL — HIGH (ref 1–3.3)
MANUAL SMEAR VERIFICATION: SIGNIFICANT CHANGE UP
MCHC RBC-ENTMCNC: 30 PG — SIGNIFICANT CHANGE UP (ref 27–34)
MCHC RBC-ENTMCNC: 30.4 PG — SIGNIFICANT CHANGE UP (ref 27–34)
MCHC RBC-ENTMCNC: 33.3 GM/DL — SIGNIFICANT CHANGE UP (ref 32–36)
MCHC RBC-ENTMCNC: 34.2 GM/DL — SIGNIFICANT CHANGE UP (ref 32–36)
MCV RBC AUTO: 88.9 FL — SIGNIFICANT CHANGE UP (ref 80–100)
MCV RBC AUTO: 90.1 FL — SIGNIFICANT CHANGE UP (ref 80–100)
MONOCYTES # BLD AUTO: 1.05 K/UL — HIGH (ref 0–0.9)
MONOCYTES NFR BLD AUTO: 5.3 % — SIGNIFICANT CHANGE UP (ref 2–14)
NEUTROPHILS # BLD AUTO: 13.68 K/UL — HIGH (ref 1.8–7.4)
NEUTROPHILS NFR BLD AUTO: 69 % — SIGNIFICANT CHANGE UP (ref 43–77)
NRBC # BLD: 0 /100 WBCS — SIGNIFICANT CHANGE UP (ref 0–0)
PLAT MORPH BLD: NORMAL — SIGNIFICANT CHANGE UP
PLATELET # BLD AUTO: 434 K/UL — HIGH (ref 150–400)
PLATELET # BLD AUTO: 497 K/UL — HIGH (ref 150–400)
POLYCHROMASIA BLD QL SMEAR: SLIGHT — SIGNIFICANT CHANGE UP
POTASSIUM SERPL-MCNC: 3.2 MMOL/L — LOW (ref 3.5–5.3)
POTASSIUM SERPL-SCNC: 3.2 MMOL/L — LOW (ref 3.5–5.3)
PROT SERPL-MCNC: 7.7 G/DL — SIGNIFICANT CHANGE UP (ref 6–8.3)
RBC # BLD: 4.54 M/UL — SIGNIFICANT CHANGE UP (ref 3.8–5.2)
RBC # BLD: 4.67 M/UL — SIGNIFICANT CHANGE UP (ref 3.8–5.2)
RBC # FLD: 13.2 % — SIGNIFICANT CHANGE UP (ref 10.3–14.5)
RBC # FLD: 13.2 % — SIGNIFICANT CHANGE UP (ref 10.3–14.5)
RBC BLD AUTO: ABNORMAL
SMUDGE CELLS # BLD: PRESENT — SIGNIFICANT CHANGE UP
SODIUM SERPL-SCNC: 144 MMOL/L — SIGNIFICANT CHANGE UP (ref 135–145)
TROPONIN T, HIGH SENSITIVITY RESULT: <6 NG/L — SIGNIFICANT CHANGE UP (ref 0–51)
TROPONIN T, HIGH SENSITIVITY RESULT: <6 NG/L — SIGNIFICANT CHANGE UP (ref 0–51)
VARIANT LYMPHS # BLD: 0.9 % — SIGNIFICANT CHANGE UP (ref 0–6)
WBC # BLD: 18.6 K/UL — HIGH (ref 3.8–10.5)
WBC # BLD: 19.82 K/UL — HIGH (ref 3.8–10.5)
WBC # FLD AUTO: 18.6 K/UL — HIGH (ref 3.8–10.5)
WBC # FLD AUTO: 19.82 K/UL — HIGH (ref 3.8–10.5)

## 2024-08-25 PROCEDURE — 99285 EMERGENCY DEPT VISIT HI MDM: CPT

## 2024-08-25 PROCEDURE — 74177 CT ABD & PELVIS W/CONTRAST: CPT | Mod: 26,MC

## 2024-08-25 PROCEDURE — 93010 ELECTROCARDIOGRAM REPORT: CPT

## 2024-08-25 RX ORDER — HALOPERIDOL 1 MG
5 TABLET ORAL ONCE
Refills: 0 | Status: COMPLETED | OUTPATIENT
Start: 2024-08-25 | End: 2024-08-25

## 2024-08-25 RX ORDER — SODIUM CHLORIDE 9 MG/ML
1000 INJECTION INTRAMUSCULAR; INTRAVENOUS; SUBCUTANEOUS ONCE
Refills: 0 | Status: COMPLETED | OUTPATIENT
Start: 2024-08-25 | End: 2024-08-25

## 2024-08-25 RX ORDER — LORAZEPAM 4 MG/ML
1 INJECTION INTRAMUSCULAR; INTRAVENOUS ONCE
Refills: 0 | Status: DISCONTINUED | OUTPATIENT
Start: 2024-08-25 | End: 2024-08-25

## 2024-08-25 RX ORDER — POTASSIUM CHLORIDE 10 MEQ
40 TABLET, EXT RELEASE, PARTICLES/CRYSTALS ORAL ONCE
Refills: 0 | Status: COMPLETED | OUTPATIENT
Start: 2024-08-25 | End: 2024-08-25

## 2024-08-25 RX ORDER — KETOROLAC TROMETHAMINE 30 MG/ML
15 INJECTION, SOLUTION INTRAMUSCULAR ONCE
Refills: 0 | Status: DISCONTINUED | OUTPATIENT
Start: 2024-08-25 | End: 2024-08-25

## 2024-08-25 RX ORDER — MAGNESIUM, ALUMINUM HYDROXIDE 200-225/5
30 SUSPENSION, ORAL (FINAL DOSE FORM) ORAL ONCE
Refills: 0 | Status: COMPLETED | OUTPATIENT
Start: 2024-08-25 | End: 2024-08-25

## 2024-08-25 RX ORDER — DIPHENHYDRAMINE HCL 50 MG
12.5 CAPSULE ORAL ONCE
Refills: 0 | Status: COMPLETED | OUTPATIENT
Start: 2024-08-25 | End: 2024-08-25

## 2024-08-25 RX ORDER — FAMOTIDINE 10 MG/ML
20 INJECTION INTRAVENOUS ONCE
Refills: 0 | Status: COMPLETED | OUTPATIENT
Start: 2024-08-25 | End: 2024-08-25

## 2024-08-25 RX ADMIN — Medication 5 MILLIGRAM(S): at 21:11

## 2024-08-25 RX ADMIN — FAMOTIDINE 20 MILLIGRAM(S): 10 INJECTION INTRAVENOUS at 21:09

## 2024-08-25 RX ADMIN — LORAZEPAM 1 MILLIGRAM(S): 4 INJECTION INTRAMUSCULAR; INTRAVENOUS at 23:58

## 2024-08-25 RX ADMIN — SODIUM CHLORIDE 1000 MILLILITER(S): 9 INJECTION INTRAMUSCULAR; INTRAVENOUS; SUBCUTANEOUS at 21:30

## 2024-08-25 RX ADMIN — KETOROLAC TROMETHAMINE 15 MILLIGRAM(S): 30 INJECTION, SOLUTION INTRAMUSCULAR at 21:10

## 2024-08-25 RX ADMIN — Medication 12.5 MILLIGRAM(S): at 21:10

## 2024-08-25 RX ADMIN — Medication 30 MILLILITER(S): at 21:10

## 2024-08-25 NOTE — ED ADULT NURSE NOTE - OBJECTIVE STATEMENT
53 F / Pmhx of bipolar , Cyclic vomiting syndrome ,  bibems  from home endorsing nausea and vomiting . Per patient she was vomiting x 5 days was seen at [facility] and  discharged home yesterday . Per at home she contnued vomiting . Patient also endorses epigastric pain, denies sob, cp, fevers .

## 2024-08-25 NOTE — ED PROVIDER NOTE - CLINICAL SUMMARY MEDICAL DECISION MAKING FREE TEXT BOX
53-year-old female with epigastric pain cyclical vomiting  will evaluate for cyclical emesis abdomen is overall soft and benign we will treat with GI cocktail plus Haldol received  a milligrams IM Zofran prior to arrival.  Screen for atypical ACS   plan for CBC CMP troponin lipase lactate IV fluids GI cocktail Haldol EKG   EKG

## 2024-08-25 NOTE — ED PROVIDER NOTE - INPATIENT RESIDENT/ACP NOTIFIED
PETER Muscle Hinge Flap Text: The defect edges were debeveled with a #15 scalpel blade.  Given the size, depth and location of the defect and the proximity to free margins a muscle hinge flap was deemed most appropriate.  Using a sterile surgical marker, an appropriate hinge flap was drawn incorporating the defect. The area thus outlined was incised with a #15 scalpel blade.  The skin margins were undermined to an appropriate distance in all directions utilizing iris scissors.

## 2024-08-25 NOTE — ED ADULT NURSE NOTE - NSSEPSISNEWALTERMENTAL_ED_A_ED
History  Chief Complaint   Patient presents with    Shortness of Breath     Pt arrives via EMS from SNF with worsening SOB, pt denies cough/chest pain/fever  pulse 85%, placed on 6L nasal cannula     77 yo M presenting from NH with hypoxia/SOB  Pt is poor historian  Pt is tachypneic, O2 was placed by EMS, requiring 6L NC to maintain O2 sat of 92-93%  EMS report pt was cyanotic when found  No chronic lung problems  Pt denies any fevers, chills, cough, CP, abdominal pain, leg pain/swelling  Recently hospitalized for about 1 month, discharged 10 days ago for chronic L foot osteomyelitis and found to have myriad of other issues including anemia, afib, PAD  MDM: 77 yo M with SOB/hypoxia- will get septic workup, cardiac workup, COVID testing, admit  Reviewed code status with pt  Pt presents with DNR form  He states he agrees with DNR but is agreeable to intubation if needed           Prior to Admission Medications   Prescriptions Last Dose Informant Patient Reported? Taking?    FLUoxetine (PROzac) 40 MG capsule  Self Yes Yes   Sig: Take 40 mg by mouth daily   Memantine HCl ER (NAMENDA XR) 14 MG CP24  Self Yes Yes   Sig: Take 1 capsule by mouth daily   Patiromer Sorbitex Calcium (Veltassa) 8 4 g PACK   No No   Sig: Take 8 4 g by mouth 2 (two) times a week   amLODIPine (NORVASC) 5 mg tablet  Self Yes Yes   Sig: Take 1 tablet by mouth daily   calcitriol (ROCALTROL) 0 25 mcg capsule   No Yes   Sig: Take 1 capsule (0 25 mcg total) by mouth 3 (three) times a week   clopidogrel (PLAVIX) 75 mg tablet  Self No Yes   Sig: Take 1 tablet by mouth daily   doxycycline hyclate (VIBRAMYCIN) 100 mg capsule   No No   Sig: Take 1 capsule (100 mg total) by mouth every 12 (twelve) hours   ergocalciferol (VITAMIN D2) 50,000 units   No Yes   Sig: Take 1 capsule (50,000 Units total) by mouth once a week   ferrous sulfate 324 (65 Fe) mg   No Yes   Sig: Take 1 tablet (324 mg total) by mouth daily before breakfast   finasteride (PROSCAR) 5 mg tablet   No Yes   Sig: Take 1 tablet (5 mg total) by mouth daily   furosemide (LASIX) 40 mg tablet  Self Yes No   Sig: Take 40 mg by mouth daily   glipiZIDE (GLUCOTROL XL) 2 5 mg 24 hr tablet  Self Yes No   Sig: Take 1 tablet by mouth daily   polyethylene glycol (MIRALAX) 17 g packet   No Yes   Sig: Take 17 g by mouth daily   primidone (MYSOLINE) 50 mg tablet  Self Yes No   Sig: Take 50 mg by mouth daily   simvastatin (ZOCOR) 20 mg tablet  Self Yes No   Sig: Take 20 mg by mouth daily at bedtime   sodium bicarbonate 650 mg tablet  Self No No   Sig: Take 1 tablet by mouth 2 (two) times daily after meals   tamsulosin (FLOMAX) 0 4 mg  Self Yes No   Sig: Take 0 4 mg by mouth daily with dinner   warfarin (COUMADIN) 5 mg tablet   No No   Sig: Take 1 tablet (5 mg total) by mouth once for 1 dose   warfarin (COUMADIN) 7 5 mg tablet   No No   Sig: Take 1 tablet (7 5 mg total) by mouth once for 1 dose      Facility-Administered Medications: None       Past Medical History:   Diagnosis Date    [facility]fib [facility] )     Alzheimer's disease ([facility] )     Depression     Diabetes mellitus (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 )     Memory loss     Osteomyelitis of right foot ([facility] )     Renal disorder        Past Surgical History:   Procedure Laterality Date    APPENDECTOMY  1945    FEMORAL ARTERY - TIBIAL ARTERY BYPASS GRAFT Right 01/08/2014    R SFA- PT w/ nonrev GSV, PreVena VAC- Balshi    FEMORAL ARTERY - TIBIAL ARTERY BYPASS GRAFT Left 06/04/2015    L SFA- PT w/ Cryovein- Ivan/ Balshi    FOOT SURGERY  06/14/2013    I&D with vac    IR LOWER EXTREMITY / INTERVENTION  7/24/2020    WOUND DEBRIDEMENT Right 03/07/2014    R thigh wound washout, VAC- Balmarioi       Family History   Problem Relation Age of Onset  Diabetes Mother     Heart failure Mother     Hypertension Mother    Snell Cancer Father     Hypertension Sister     Hyperthyroidism Sister     Stroke Brother     Diabetes Maternal Grandmother     Clotting disorder Maternal Grandmother     No Known Problems Maternal Grandfather     No Known Problems Paternal Grandmother     No Known Problems Paternal Grandfather     Diabetes Brother     Arthritis Brother     Glaucoma Brother     Cataracts Brother     No Known Problems Son     Hyperthyroidism Daughter     Kidney disease Daughter     COPD Daughter     Diabetes Brother     Cancer Brother      I have reviewed and agree with the history as documented  E-Cigarette/Vaping    E-Cigarette Use Never User      E-Cigarette/Vaping Substances    Nicotine No     THC No     CBD No     Flavoring No     Other No     Unknown No      Social History     Tobacco Use    Smoking status: Never Smoker    Smokeless tobacco: Never Used   Substance Use Topics    Alcohol use: Yes     Comment: one beer on his birthday    Drug use: No       Review of Systems   Constitutional: Negative for chills, fever and unexpected weight change  HENT: Negative for ear pain, rhinorrhea and sore throat  Eyes: Negative for pain and visual disturbance  Respiratory: Positive for shortness of breath  Negative for cough  Cardiovascular: Negative for chest pain and leg swelling  Gastrointestinal: Negative for abdominal pain, constipation, diarrhea, nausea and vomiting  Endocrine: Negative for polydipsia, polyphagia and polyuria  Genitourinary: Negative for dysuria, frequency, hematuria and urgency  Musculoskeletal: Negative for back pain, myalgias and neck pain  Skin: Negative for color change and rash  Allergic/Immunologic: Negative for environmental allergies and immunocompromised state  Neurological: Negative for dizziness, weakness, light-headedness, numbness and headaches     Hematological: Negative for adenopathy  Does not bruise/bleed easily  Psychiatric/Behavioral: Negative for agitation and confusion  All other systems reviewed and are negative  Physical Exam  Physical Exam  Vitals signs and nursing note reviewed  Constitutional:       Appearance: He is well-developed  HENT:      Head: Normocephalic and atraumatic  Nose: Nose normal    Eyes:      Conjunctiva/sclera: Conjunctivae normal    Neck:      Musculoskeletal: Normal range of motion and neck supple  Cardiovascular:      Rate and Rhythm: Normal rate and regular rhythm  Heart sounds: Normal heart sounds  Pulmonary:      Effort: Tachypnea present  Breath sounds: No stridor  No wheezing or rales  Chest:      Chest wall: No tenderness  Abdominal:      General: There is no distension  Palpations: Abdomen is soft  Tenderness: There is no abdominal tenderness  There is no guarding or rebound  Musculoskeletal:         General: No deformity  Comments: B/l LE with mild pitting edema b/l, no calf swelling/ttp  L foot: chronic wound to heel and lateral foot, no evidence of surrounding infection/erythema/draingage, no sensation which pt reports is chronic/unchanged   Skin:     General: Skin is warm and dry  Findings: No rash  Neurological:      Mental Status: He is alert and oriented to person, place, and time  Motor: No abnormal muscle tone  Coordination: Coordination normal    Psychiatric:         Thought Content:  Thought content normal          Judgment: Judgment normal          Vital Signs  ED Triage Vitals   Temperature Pulse Respirations Blood Pressure SpO2   08/17/20 1410 08/17/20 1400 08/17/20 1410 08/17/20 1410 08/17/20 1400   (!) 97 2 °F (36 2 °C) 82 22 153/68 94 %      Temp Source Heart Rate Source Patient Position - Orthostatic VS BP Location FiO2 (%)   08/17/20 1410 08/17/20 1410 08/17/20 1600 08/17/20 1410 --   Temporal Monitor Lying Right arm       Pain Score       -- Vitals:    08/17/20 1400 08/17/20 1410 08/17/20 1415 08/17/20 1600   BP:  153/68  134/60   Pulse: 82 82 84 82   Patient Position - Orthostatic VS:    Lying         Visual Acuity      ED Medications  Medications   heparin (porcine) 25,000 units in 250 mL infusion (premix) (has no administration in time range)   cefepime (MAXIPIME) 2 g/50 mL dextrose IVPB (0 mg Intravenous Stopped 8/17/20 1506)   vancomycin (VANCOCIN) 1,500 mg in sodium chloride 0 9 % 250 mL IVPB (1,500 mg Intravenous New Bag 8/17/20 1512)       Diagnostic Studies  Results Reviewed     Procedure Component Value Units Date/Time    Lactic acid 2 Hours [[id number]] Collected:  08/17/20 1633    Lab Status: In process Specimen:  Blood from Hand, Right Updated:  08/17/20 1638    Novel Coronavirus (Covid-19),PCR [facility] [[id number]]  (Normal) Collected:  08/17/20 1417    Lab Status:  Final result Specimen:  Nares from Nose Updated:  08/17/20 1526     SARS-CoV-2 Negative    Narrative: The specimen collection materials, transport medium, and/or testing methodology utilized in the production of these test results have been proven to be reliable in a limited validation with an abbreviated program under the Emergency Utilization Authorization provided by the FDA  Testing reported as "Presumptive positive" will be confirmed with secondary testing with a reference laboratory to ensure result accuracy  Clinical caution and judgement should be used with the interpretation of these results with consideration of the clinical impression and other laboratory testing  Testing reported as "Positive" or "Negative" has been proven to be accurate according to standard laboratory validation requirements  All testing is performed with control materials showing appropriate reactivity at standard intervals        NT-BNP PRO [[id number]]  (Abnormal) Collected:  08/17/20 1418    Lab Status:  Final result Specimen:  Blood from Arm, Left Updated:  08/17/20 1985 NT-proBNP 41,086 pg/mL     Comprehensive metabolic panel [[id number]]  (Abnormal) Collected:  08/17/20 1418    Lab Status:  Final result Specimen:  Blood from Arm, Left Updated:  08/17/20 1456     Sodium 132 mmol/L      Potassium 4 9 mmol/L      Chloride 98 mmol/L      CO2 16 mmol/L      ANION GAP 18 mmol/L       mg/dL      Creatinine 3 87 mg/dL      Glucose 275 mg/dL      Calcium 8 1 mg/dL       U/L      ALT 83 U/L      Alkaline Phosphatase 251 U/L      Total Protein 8 0 g/dL      Albumin 2 4 g/dL      Total Bilirubin 0 30 mg/dL      eGFR 13 ml/min/1 73sq m     Narrative:       National Kidney Disease Foundation guidelines for Chronic Kidney Disease (CKD):     Stage 1 with normal or high GFR (GFR > 90 mL/min/1 73 square meters)    Stage 2 Mild CKD (GFR = 60-89 mL/min/1 73 square meters)    Stage 3A Moderate CKD (GFR = 45-59 mL/min/1 73 square meters)    Stage 3B Moderate CKD (GFR = 30-44 mL/min/1 73 square meters)    Stage 4 Severe CKD (GFR = 15-29 mL/min/1 73 square meters)    Stage 5 End Stage CKD (GFR <15 mL/min/1 73 square meters)  Note: GFR calculation is accurate only with a steady state creatinine    Lactic Acid [[id number]]  (Abnormal) Collected:  08/17/20 1417    Lab Status:  Final result Specimen:  Blood from Arm, Left Updated:  08/17/20 1451     LACTIC ACID 2 9 mmol/L     Narrative:       Result may be elevated if tourniquet was used during collection      Troponin I [[id number]]  (Abnormal) Collected:  08/17/20 1417    Lab Status:  Final result Specimen:  Blood from Arm, Left Updated:  08/17/20 1449     Troponin I 0 13 ng/mL     D-dimer, quantitative [[id number]]  (Abnormal) Collected:  08/17/20 1418    Lab Status:  Final result Specimen:  Blood from Arm, Left Updated:  08/17/20 1444     D-Dimer, Quant 3 23 ug/ml FEU     Protime-INR [[id number]]  (Abnormal) Collected:  08/17/20 1418    Lab Status:  Final result Specimen:  Blood from Arm, Left Updated:  08/17/20 1439     Protime 29 0 seconds      INR 2 82    APTT [[id number]]  (Abnormal) Collected:  08/17/20 1418    Lab Status:  Final result Specimen:  Blood from Arm, Left Updated:  08/17/20 1439     PTT 51 seconds     CBC and differential [[id number]]  (Abnormal) Collected:  08/17/20 1417    Lab Status:  Final result Specimen:  Blood from Arm, Left Updated:  08/17/20 1428     WBC 15 55 Thousand/uL      RBC 2 75 Million/uL      Hemoglobin 7 8 g/dL      Hematocrit 26 1 %      MCV 95 fL      MCH 28 4 pg      MCHC 29 9 g/dL      RDW 15 7 %      MPV 10 4 fL      Platelets 826 Thousands/uL      nRBC 0 /100 WBCs      Neutrophils Relative 82 %      Immat GRANS % 1 %      Lymphocytes Relative 8 %      Monocytes Relative 9 %      Eosinophils Relative 0 %      Basophils Relative 0 %      Neutrophils Absolute 12 57 Thousands/µL      Immature Grans Absolute 0 20 Thousand/uL      Lymphocytes Absolute 1 27 Thousands/µL      Monocytes Absolute 1 45 Thousand/µL      Eosinophils Absolute 0 00 Thousand/µL      Basophils Absolute 0 06 Thousands/µL     Procalcitonin [[id number]] Collected:  08/17/20 1418    Lab Status: In process Specimen:  Blood from Arm, Left Updated:  08/17/20 1426    Blood culture #2 [[id number]] Collected:  08/17/20 1418    Lab Status: In process Specimen:  Blood from Arm, Left Updated:  08/17/20 1425    Blood culture #1 [[id number]] Collected:  08/17/20 1418    Lab Status: In process Specimen:  Blood from Arm, Left Updated:  08/17/20 1425    UA w Reflex to Microscopic w Reflex to Culture [[id number]]     Lab Status:  No result Specimen:  Urine                  XR chest 1 view portable   ED Interpretation by Castro Grajeda DO (08/17 1448)   IMPRESSION:     Multifocal bilateral consolidation and groundglass opacity  The nodular areas are more suggestive of pneumonia than edema          Final Result by Janessa Saucedo MD (08/17 1446)      Multifocal bilateral consolidation and groundglass opacity    The nodular areas are more suggestive of pneumonia than edema  Workstation performed: [id number]                    Procedures  Procedures         ED Course  ED Course as of Aug 17 1649   Mon Aug 17, 2020   1415 O2 sat 92-93% on 6L NC      1430 WBC(!): 15 55   1431 Pt now meets SIRS, will give Cefepime for possible PNA      1431 7 8 11 days ago   Hemoglobin(!): 7 8   1442 EKG: NSR @ 84 bpm, normal axis, normal intervals, qtc 475, nonspecific ST changes      1445 D-Dimer, Quant(!): 3 23   1449 Troponin I(!): 0 13   1451 Multifocal bilateral consolidation and groundglass opacity  The nodular areas are more suggestive of pneumonia than edema  [place] 7 for admission      1520 Likely covid, pt getting IV abx, will hold off on aggressive fluids and concern for CHF as well with elevated BNP   LACTIC ACID(!!): 2 9   1522 NT-proBNP(!): 41,086   1528 Given elevated ddimer whether from PE or from covid, based on hospital COVID order set, A/C with lovenox      1529 EXT SARS-COV-2: Negative   1542 Per pharmacy, pt needs heparin gtt and lovenox contra-indication, changes made to regimen  Given therapeutic INR, will not do heparin bolus and will just start drip          US AUDIT      Most Recent Value   Initial Alcohol Screen: US AUDIT-C    1  How often do you have a drink containing alcohol?  0 Filed at: 08/17/2020 1411   2  How many drinks containing alcohol do you have on a typical day you are drinking? 0 Filed at: 08/17/2020 1411   3a  Male UNDER 65: How often do you have five or more drinks on one occasion? 0 Filed at: 08/17/2020 1411   3b  FEMALE Any Age, or MALE 65+: How often do you have 4 or more drinks on one occassion?   0 Filed at: 08/17/2020 1411   Audit-C Score  0 Filed at: 08/17/2020 1411            HEART Risk Score      Most Recent Value   Heart Score Risk Calculator   History  0 Filed at: 08/17/2020 1426   ECG  0 Filed at: 08/17/2020 1426   Age  2 Filed at: 08/17/2020 1426   Risk Factors  2 Filed at: 08/17/2020 1426   Troponin  2 Filed at: 08/17/2020 1426   HEART Score  6 Filed at: 08/17/2020 1426            CHANDAN/DAST-10      Most Recent Value   How many times in the past year have you    Used an illegal drug or used a prescription medication for non-medical reasons? Never Filed at: 08/17/2020 1411              Initial Sepsis Screening     Row Name 08/17/20 1508 08/17/20 1431 08/17/20 1426          Is the patient's history suggestive of a new or worsening infection? (!) Yes (Proceed)  -KH  (!) Yes (Proceed)  -[address]        Suspected source of infection  pneumonia  -KH  pneumonia  -KH  pneumonia  -KH      Are two or more of the following signs & symptoms of infection both present and new to the patient? (!) Yes (Proceed)  -KH  (!) Yes (Proceed)  -KH  No  -KH      Indicate SIRS criteria  Tachypnea > 20 resp per min;Leukocytosis (WBC > 58586 IJL)  -KH  Leukocytosis (WBC > 07231 IJL); Tachypnea > 20 resp per min  -KH  Tachypnea > 20 resp per min  -KH      If the answer is yes to both questions, suspicion of sepsis is present            If severe sepsis is present AND tissue hypoperfusion perists in the hour after fluid resuscitation or lactate > 4, the patient meets criteria for SEPTIC SHOCK            Are any of the following organ dysfunction criteria present within 6 hours of suspected infection and SIRS criteria that are NOT considered to be chronic conditions? (!) Yes  -KH  No  -KH        Organ dysfunction  Lactate > 2 0 mmol/L  -KH          Date of presentation of severe sepsis  08/17/20  -[address]          Time of presentation of severe sepsis  1508  -KH          Tissue hypoperfusion persists in the hour after crystalloid fluid administration, evidenced, by either:            Was hypotension present within one hour of the conclusion of crystalloid fluid administration?   No  -KH          Date of presentation of septic shock            Time of presentation of septic shock              User Key  (r) = Recorded By, (t) = Taken By, (c) = Cosigned By    [address] Name Provider Type    [address] [facility],  Physician            Verona Doyle' Criteria for PE      Most Recent Value   To' Criteria for PE   Clinical signs and symptoms of DVT  0 Filed at: 08/17/2020 1426   PE is primary diagnosis or equally likely  0 Filed at: 08/17/2020 1426   HR >100  0 Filed at: 08/17/2020 1426   Immobilization at least 3 days or Surgery in the previous 4 weeks  1 5 Filed at: 08/17/2020 1426   Previous, objectively diagnosed PE or DVT  0 Filed at: 08/17/2020 1426   Hemoptysis  0 Filed at: 08/17/2020 1426   Malignancy with treatment within 6 months or palliative  0 Filed at: 08/17/2020 1426   Wells' Criteria Total  1 5 Filed at: 08/17/2020 1426                  [facility]  Number of Diagnoses or Management Options  SALO (acute kidney injury) ([facility] ):   Elevated troponin:   Multifocal pneumonia:   Respiratory failure with hypoxia [facility]):   Diagnosis management comments: [address] M with SOB/hypoxia, requiring 6L NC  CXR with multiple infiltrates, treating for possible hospital acquired PNA with Cefepime/Vanc  Also concern for COVID despite negative COVID testing here, continuing precautions  Elevated troponin/BNP so possibly cardiac etiology/heart failure  Pt with elevated ddimer, cannot r/o PE but also may be elevated with COVID, ordered A/C   Admitted for further workup/evaluation       Amount and/or Complexity of Data Reviewed  Clinical lab tests: ordered and reviewed  Tests in the radiology section of CPT®: reviewed and ordered  Tests in the medicine section of CPT®: ordered and reviewed  Review and summarize past medical records: yes  Independent visualization of images, tracings, or specimens: yes          Disposition  Final diagnoses:   Multifocal pneumonia   Respiratory failure with hypoxia (HCC)   SALO (acute kidney injury) ([facility] )   Elevated troponin     Time reflects when diagnosis was documented in both MDM as applicable and the Disposition within this note     Time User Action Codes Description Comment    8/17/2020  3:22 PM Mariea Fake A Add [J18 9] Multifocal pneumonia     8/17/2020  3:22 PM Mariea Fake A Add [J96 91] Respiratory failure with hypoxia ([facility] )     8/17/2020  3:22 PM Mariea Fake A Add [N17 9] SALO (acute kidney injury) ([facility] )     8/17/2020  3:22 PM Mariea Fake A Add [R79 89] Elevated troponin       ED Disposition     ED Disposition Condition Date/Time Comment    Admit Stable Mon Aug 17, 2020  3:22 PM Case was discussed with AZAEL and the patient's admission status was agreed to be Admission Status: inpatient status to the service of Dr Briseyda Box   Follow-up Information    None         Patient's Medications   Discharge Prescriptions    No medications on file     No discharge procedures on file      PDMP Review     None          ED Provider  Electronically Signed by           Kemi Graham DO  08/17/20 5099 No

## 2024-08-25 NOTE — ED PROVIDER NOTE - DISPOSITION TYPE
Patient: Tyson Mcintosh  
ADMIT

## 2024-08-25 NOTE — ED PROVIDER NOTE - PHYSICAL EXAMINATION
VITAL SIGNS: I have reviewed nursing notes and confirm.  CONSTITUTIONAL: Well appearing, in no acute distress.   SKIN:  warm and dry, no acute rash.   HEAD:  normocephalic, atraumatic.  EYES: EOM intact; conjunctiva and sclera clear.  ENT: No nasal discharge; airway clear.   NECK: Supple.  CARD: S1, S2, Regular rate and rhythm.   RESP:  Clear to auscultation b/l, no wheezes, rales or rhonchi.  ABD: Mild epigastric tenderness no rebound or guarding  EXT: Normal ROM. No peripheral edema. Pulses intact and equal b/l.  NEURO: Alert, oriented, grossly unremarkable  PSYCH: Cooperative, mood and affect appropriate.

## 2024-08-25 NOTE — ED PROVIDER NOTE - OBJECTIVE STATEMENT
54 yo female w/ pmh IBS, cyclical vomiting, bipolar d/o, psh cholecystectomy (2022) and splenectomy (13 yo) bibems c/o 2 days of worsening epigastric abdominal pain which is nonradiating associated with multiple episodes of nonbloody nonbilious emesis.  States that she was just admitted to [facility] and discharged 5 days ago.  Returns today with worsening symptoms.  Denies fever chills cough.  Has normal bowel movements.

## 2024-08-25 NOTE — ED PROVIDER NOTE - PROGRESS NOTE DETAILS
rpt lactic neg, trop neg, ct neg, pt not tolerating po rpt lactic neg, trop neg, ct neg, pt not tolerating poAdmitted for intractable nausea and p.o. intolerance, refusing p.o. in the ED was not able to tolerate Maalox

## 2024-08-26 ENCOUNTER — INPATIENT (INPATIENT)
Facility: HOSPITAL | Age: 54
LOS: 0 days | Discharge: HOME | End: 2024-08-27
Attending: STUDENT IN AN ORGANIZED HEALTH CARE EDUCATION/TRAINING PROGRAM | Admitting: STUDENT IN AN ORGANIZED HEALTH CARE EDUCATION/TRAINING PROGRAM
Payer: COMMERCIAL

## 2024-08-26 DIAGNOSIS — E87.6 HYPOKALEMIA: ICD-10-CM

## 2024-08-26 DIAGNOSIS — Z90.49 ACQUIRED ABSENCE OF OTHER SPECIFIED PARTS OF DIGESTIVE TRACT: Chronic | ICD-10-CM

## 2024-08-26 DIAGNOSIS — D72.829 ELEVATED WHITE BLOOD CELL COUNT, UNSPECIFIED: ICD-10-CM

## 2024-08-26 DIAGNOSIS — Z29.9 ENCOUNTER FOR PROPHYLACTIC MEASURES, UNSPECIFIED: ICD-10-CM

## 2024-08-26 DIAGNOSIS — R11.15 CYCLICAL VOMITING SYNDROME UNRELATED TO MIGRAINE: ICD-10-CM

## 2024-08-26 DIAGNOSIS — Z90.81 ACQUIRED ABSENCE OF SPLEEN: Chronic | ICD-10-CM

## 2024-08-26 DIAGNOSIS — F31.9 BIPOLAR DISORDER, UNSPECIFIED: ICD-10-CM

## 2024-08-26 LAB
ALBUMIN SERPL ELPH-MCNC: 3.8 G/DL — SIGNIFICANT CHANGE UP (ref 3.3–5)
ALP SERPL-CCNC: 50 U/L — SIGNIFICANT CHANGE UP (ref 40–120)
ALT FLD-CCNC: 17 U/L — SIGNIFICANT CHANGE UP (ref 10–45)
ANION GAP SERPL CALC-SCNC: 10 MMOL/L — SIGNIFICANT CHANGE UP (ref 5–17)
ANION GAP SERPL CALC-SCNC: 18 MMOL/L — HIGH (ref 5–17)
AST SERPL-CCNC: 17 U/L — SIGNIFICANT CHANGE UP (ref 10–40)
BILIRUB SERPL-MCNC: 0.7 MG/DL — SIGNIFICANT CHANGE UP (ref 0.2–1.2)
BUN SERPL-MCNC: 10 MG/DL — SIGNIFICANT CHANGE UP (ref 7–23)
BUN SERPL-MCNC: 14 MG/DL — SIGNIFICANT CHANGE UP (ref 7–23)
CALCIUM SERPL-MCNC: 9 MG/DL — SIGNIFICANT CHANGE UP (ref 8.4–10.5)
CALCIUM SERPL-MCNC: 9.1 MG/DL — SIGNIFICANT CHANGE UP (ref 8.4–10.5)
CHLORIDE SERPL-SCNC: 102 MMOL/L — SIGNIFICANT CHANGE UP (ref 96–108)
CHLORIDE SERPL-SCNC: 104 MMOL/L — SIGNIFICANT CHANGE UP (ref 96–108)
CO2 SERPL-SCNC: 16 MMOL/L — LOW (ref 22–31)
CO2 SERPL-SCNC: 26 MMOL/L — SIGNIFICANT CHANGE UP (ref 22–31)
CREAT SERPL-MCNC: 0.84 MG/DL — SIGNIFICANT CHANGE UP (ref 0.5–1.3)
CREAT SERPL-MCNC: 0.97 MG/DL — SIGNIFICANT CHANGE UP (ref 0.5–1.3)
EGFR: 69 ML/MIN/1.73M2 — SIGNIFICANT CHANGE UP
EGFR: 82 ML/MIN/1.73M2 — SIGNIFICANT CHANGE UP
GLUCOSE SERPL-MCNC: 106 MG/DL — HIGH (ref 70–99)
GLUCOSE SERPL-MCNC: 145 MG/DL — HIGH (ref 70–99)
MAGNESIUM SERPL-MCNC: 2.3 MG/DL — SIGNIFICANT CHANGE UP (ref 1.6–2.6)
PHOSPHATE SERPL-MCNC: 3.6 MG/DL — SIGNIFICANT CHANGE UP (ref 2.5–4.5)
POTASSIUM SERPL-MCNC: 3.4 MMOL/L — LOW (ref 3.5–5.3)
POTASSIUM SERPL-MCNC: SIGNIFICANT CHANGE UP (ref 3.5–5.3)
POTASSIUM SERPL-SCNC: 3.4 MMOL/L — LOW (ref 3.5–5.3)
POTASSIUM SERPL-SCNC: SIGNIFICANT CHANGE UP (ref 3.5–5.3)
PROT SERPL-MCNC: 6.8 G/DL — SIGNIFICANT CHANGE UP (ref 6–8.3)
SODIUM SERPL-SCNC: 136 MMOL/L — SIGNIFICANT CHANGE UP (ref 135–145)
SODIUM SERPL-SCNC: 140 MMOL/L — SIGNIFICANT CHANGE UP (ref 135–145)

## 2024-08-26 PROCEDURE — 99223 1ST HOSP IP/OBS HIGH 75: CPT | Mod: GC

## 2024-08-26 RX ORDER — ACETAMINOPHEN 325 MG/1
650 TABLET ORAL EVERY 6 HOURS
Refills: 0 | Status: DISCONTINUED | OUTPATIENT
Start: 2024-08-26 | End: 2024-08-27

## 2024-08-26 RX ORDER — CELECOXIB 400 MG/1
200 CAPSULE ORAL EVERY 24 HOURS
Refills: 0 | Status: DISCONTINUED | OUTPATIENT
Start: 2024-08-26 | End: 2024-08-27

## 2024-08-26 RX ORDER — TOPIRAMATE 50 MG/1
25 CAPSULE, EXTENDED RELEASE ORAL EVERY 24 HOURS
Refills: 0 | Status: DISCONTINUED | OUTPATIENT
Start: 2024-08-26 | End: 2024-08-27

## 2024-08-26 RX ORDER — POTASSIUM CHLORIDE 10 MEQ
40 TABLET, EXT RELEASE, PARTICLES/CRYSTALS ORAL ONCE
Refills: 0 | Status: COMPLETED | OUTPATIENT
Start: 2024-08-26 | End: 2024-08-26

## 2024-08-26 RX ORDER — ONDANSETRON 2 MG/ML
4 INJECTION, SOLUTION INTRAMUSCULAR; INTRAVENOUS EVERY 8 HOURS
Refills: 0 | Status: DISCONTINUED | OUTPATIENT
Start: 2024-08-26 | End: 2024-08-26

## 2024-08-26 RX ORDER — METOCLOPRAMIDE HCL 5 MG
10 TABLET ORAL ONCE
Refills: 0 | Status: COMPLETED | OUTPATIENT
Start: 2024-08-26 | End: 2024-08-26

## 2024-08-26 RX ORDER — POTASSIUM CHLORIDE 10 MEQ
10 TABLET, EXT RELEASE, PARTICLES/CRYSTALS ORAL
Refills: 0 | Status: COMPLETED | OUTPATIENT
Start: 2024-08-26 | End: 2024-08-26

## 2024-08-26 RX ORDER — LORAZEPAM 4 MG/ML
1 INJECTION INTRAMUSCULAR; INTRAVENOUS EVERY 12 HOURS
Refills: 0 | Status: DISCONTINUED | OUTPATIENT
Start: 2024-08-26 | End: 2024-08-26

## 2024-08-26 RX ORDER — SERTRALINE HYDROCHLORIDE 50 MG/1
75 TABLET, FILM COATED ORAL EVERY 24 HOURS
Refills: 0 | Status: DISCONTINUED | OUTPATIENT
Start: 2024-08-26 | End: 2024-08-27

## 2024-08-26 RX ORDER — LIDOCAINE/BENZALKONIUM/ALCOHOL
1 SOLUTION, NON-ORAL TOPICAL EVERY 24 HOURS
Refills: 0 | Status: DISCONTINUED | OUTPATIENT
Start: 2024-08-26 | End: 2024-08-27

## 2024-08-26 RX ORDER — LORAZEPAM 4 MG/ML
1 INJECTION INTRAMUSCULAR; INTRAVENOUS ONCE
Refills: 0 | Status: DISCONTINUED | OUTPATIENT
Start: 2024-08-26 | End: 2024-08-27

## 2024-08-26 RX ORDER — ENOXAPARIN SODIUM 100 MG/ML
40 INJECTION SUBCUTANEOUS EVERY 24 HOURS
Refills: 0 | Status: DISCONTINUED | OUTPATIENT
Start: 2024-08-26 | End: 2024-08-27

## 2024-08-26 RX ORDER — POTASSIUM CHLORIDE 10 MEQ
10 TABLET, EXT RELEASE, PARTICLES/CRYSTALS ORAL
Refills: 0 | Status: DISCONTINUED | OUTPATIENT
Start: 2024-08-26 | End: 2024-08-26

## 2024-08-26 RX ORDER — BUPROPION HYDROCHLORIDE 150 MG/1
150 TABLET ORAL DAILY
Refills: 0 | Status: DISCONTINUED | OUTPATIENT
Start: 2024-08-26 | End: 2024-08-27

## 2024-08-26 RX ORDER — SODIUM CHLORIDE 9 MG/ML
1000 INJECTION INTRAMUSCULAR; INTRAVENOUS; SUBCUTANEOUS ONCE
Refills: 0 | Status: DISCONTINUED | OUTPATIENT
Start: 2024-08-26 | End: 2024-08-26

## 2024-08-26 RX ORDER — CLONAZEPAM 1 MG
0.5 TABLET ORAL EVERY 12 HOURS
Refills: 0 | Status: DISCONTINUED | OUTPATIENT
Start: 2024-08-26 | End: 2024-08-26

## 2024-08-26 RX ORDER — HALOPERIDOL 1 MG
2 TABLET ORAL EVERY 6 HOURS
Refills: 0 | Status: DISCONTINUED | OUTPATIENT
Start: 2024-08-26 | End: 2024-08-26

## 2024-08-26 RX ORDER — ARIPIPRAZOLE 15 MG/1
5 TABLET ORAL EVERY 24 HOURS
Refills: 0 | Status: DISCONTINUED | OUTPATIENT
Start: 2024-08-26 | End: 2024-08-27

## 2024-08-26 RX ORDER — SERTRALINE HYDROCHLORIDE 50 MG/1
50 TABLET, FILM COATED ORAL DAILY
Refills: 0 | Status: DISCONTINUED | OUTPATIENT
Start: 2024-08-26 | End: 2024-08-26

## 2024-08-26 RX ADMIN — TOPIRAMATE 25 MILLIGRAM(S): 50 CAPSULE, EXTENDED RELEASE ORAL at 21:33

## 2024-08-26 RX ADMIN — Medication 40 MILLIEQUIVALENT(S): at 19:01

## 2024-08-26 RX ADMIN — ACETAMINOPHEN 650 MILLIGRAM(S): 325 TABLET ORAL at 21:33

## 2024-08-26 RX ADMIN — Medication 100 MILLIEQUIVALENT(S): at 07:36

## 2024-08-26 RX ADMIN — Medication 100 MILLIEQUIVALENT(S): at 03:36

## 2024-08-26 RX ADMIN — LORAZEPAM 1 MILLIGRAM(S): 4 INJECTION INTRAMUSCULAR; INTRAVENOUS at 12:38

## 2024-08-26 RX ADMIN — ENOXAPARIN SODIUM 40 MILLIGRAM(S): 100 INJECTION SUBCUTANEOUS at 09:37

## 2024-08-26 RX ADMIN — ACETAMINOPHEN 650 MILLIGRAM(S): 325 TABLET ORAL at 22:27

## 2024-08-26 RX ADMIN — Medication 40 MILLIEQUIVALENT(S): at 17:46

## 2024-08-26 RX ADMIN — Medication 10 MILLIGRAM(S): at 07:27

## 2024-08-26 RX ADMIN — Medication 100 MILLIEQUIVALENT(S): at 06:15

## 2024-08-26 RX ADMIN — Medication 100 MILLIEQUIVALENT(S): at 02:06

## 2024-08-26 RX ADMIN — ONDANSETRON 4 MILLIGRAM(S): 2 INJECTION, SOLUTION INTRAMUSCULAR; INTRAVENOUS at 01:43

## 2024-08-26 RX ADMIN — Medication 2 MILLIGRAM(S): at 02:06

## 2024-08-26 NOTE — H&P ADULT - NSHPLABSRESULTS_GEN_ALL_CORE
LABS:                         13.6   18.60 )-----------( 434      ( 25 Aug 2024 23:08 )             40.9     08-25    144  |  108  |  14  ----------------------------<  123<H>  3.2<L>   |  24  |  1.01    Ca    9.4      25 Aug 2024 20:55    TPro  7.7  /  Alb  4.3  /  TBili  1.0  /  DBili  x   /  AST  18  /  ALT  18  /  AlkPhos  60  08-25      Urinalysis Basic - ( 25 Aug 2024 20:55 )    Color: x / Appearance: x / SG: x / pH: x  Gluc: 123 mg/dL / Ketone: x  / Bili: x / Urobili: x   Blood: x / Protein: x / Nitrite: x   Leuk Esterase: x / RBC: x / WBC x   Sq Epi: x / Non Sq Epi: x / Bacteria: x            Lactate, Blood: 1.3 mmol/L (08-25 @ 23:08)  Lactate, Blood: 2.2 mmol/L (08-25 @ 20:55)      RADIOLOGY, EKG & ADDITIONAL TESTS:

## 2024-08-26 NOTE — SBIRT NOTE ADULT - NSSBIRTUNABLESCROTHER_GEN_A_CORE
Referral not appropriate. Patient reports social ETOH use about 2 drinks per week. This is within the healthy/low risk guidelines according to [employer]. Patient reports social cannabis use, however quit last week.

## 2024-08-26 NOTE — H&P ADULT - PROBLEM SELECTOR PLAN 2
Pt p/w leukocytosis to 18.6k, likely reactive to cyclic vomiting syndrome  Last admission had similar leukocytosis  Plan:  - Trend CBC w diff  - No need for Abx, no infectious s/ or symptoms

## 2024-08-26 NOTE — H&P ADULT - ASSESSMENT
52 yo female w/ pmh IBS, cyclical vomiting, bipolar d/o, psh cholecystectomy (2022) and splenectomy (13 yo) bibems c/o 2 days of worsening epigastric abdominal pain which is nonradiating associated with multiple episodes of nonbloody nonbilious emesis. Patient being admitted for cyclic vomiting syndrome, and inability to tolerate PO diet.

## 2024-08-26 NOTE — PROGRESS NOTE ADULT - PROBLEM SELECTOR PLAN 1
Pt p/w cyclic vomiting syndrome  Last year w similar admission 2/2 to believed cannabis use.  Relief w cocktail provided in ED (haldol, ativan, benadryl, zofran). Not reliably tolerating PO, and patient is reluctant so admitted to medicine  Plan:  - Haldol 2mg IV q6PRN  - Can add reglan as patient reports that also provides relief, zofran w minimal relief as per pt.  - Regular diet ordered, see how patient tolerates in AM Pt p/w cyclic vomiting syndrome  Last year w similar admission 2/2 to believed cannabis use.  Relief w cocktail provided in ED (haldol, ativan, benadryl, zofran). Not reliably tolerating PO, and patient is reluctant so admitted to medicine  Plan:  - Started on Haldol 2mg IV q6PRN, however due to multiple other psychotropic medications, switch to IV ativan  - Can add reglan as patient reports that also provides relief, zofran w minimal relief as per pt.  - Regular diet ordered, see how patient tolerates this AM Pt p/w cyclic vomiting syndrome  Last year w similar admission 2/2 to believed cannabis use.  Relief w cocktail provided in ED (haldol, ativan, benadryl, zofran). Not reliably tolerating PO, and patient is reluctant so admitted to medicine  Plan:  - discontinued haldol & klonipin, switch to IV ativan PRN to help tolerate home medications   - Can add reglan as patient reports that also provides relief, zofran w minimal relief as per pt.  - Regular diet ordered, see how patient tolerates this AM

## 2024-08-26 NOTE — H&P ADULT - HISTORY OF PRESENT ILLNESS
54 yo female w/ pmh IBS, cyclical vomiting, bipolar d/o, psh cholecystectomy (2022) and splenectomy (13 yo) bibems c/o 2 days of worsening epigastric abdominal pain which is nonradiating associated with multiple episodes of nonbloody nonbilious emesis.  States that she was just admitted to [facility] and discharged 5 days ago. Of note patient had similar presentation last year for similar admission. She reports multiple hospitalizations for cyclic vomiting syndrome, and usually haldol provides relief. Reports some relief with the anti emetics given in the ED. Otherwise, patient has no concerns currently.  Denies fever, chills, c/p, palpitations, cough, SOB, dysuria, hematuria, LE swelling, or rash.      ED course: maalox x 1, benadryl 12.5mg IV x 1, pepcid 20mg IV x 1, haldol 5mg iV x 1, toradol 15mg IV x 1, ativan 1mg x 1, NS 1 L  EKG/Vitals: T: 98.4 BP:129/91 HR:87 RR:18 O2:98% on RA  EKG:NSR, .9  Labs: Labs notable for leukocytosis to 18.60k, hypoK to 3.2. lactate 2.2 to 1.3  Imaging: CT A and P negative  Consults: N/A   52 yo female w/ pmh IBS, cyclical vomiting, bipolar d/o, psh cholecystectomy (2022) and splenectomy (13 yo) bibems c/o 2 days of worsening epigastric abdominal pain which is nonradiating associated with multiple episodes of nonbloody nonbilious emesis.  States that she was just admitted to [facility] and discharged 2 days ago. She reports still having numerous vomiting episodes so subsequently came to the ED to seek evaluation.  Of note patient had similar presentation last year for similar admission. She reports multiple hospitalizations for cyclic vomiting syndrome, and usually haldol provides relief. Reports some relief with the anti emetics given in the ED. Otherwise, patient has no concerns currently.  Denies fever, chills, c/p, palpitations, cough, SOB, dysuria, hematuria, LE swelling, or rash.      ED course: maalox x 1, benadryl 12.5mg IV x 1, pepcid 20mg IV x 1, haldol 5mg iV x 1, toradol 15mg IV x 1, ativan 1mg x 1, NS 1 L  EKG/Vitals: T: 98.4 BP:129/91 HR:87 RR:18 O2:98% on RA  EKG:NSR, .9  Labs: Labs notable for leukocytosis to 18.60k, hypoK to 3.2. lactate 2.2 to 1.3  Imaging: CT A and P negative  Consults: N/A

## 2024-08-26 NOTE — H&P ADULT - NSHPPHYSICALEXAM_GEN_ALL_CORE
Vital Signs Last 12 Hrs  T(F): 99 (08-26-24 @ 00:46), Max: 99 (08-26-24 @ 00:46)  HR: 70 (08-26-24 @ 00:46) (70 - 87)  BP: 143/83 (08-26-24 @ 00:46) (129/91 - 143/83)  BP(mean): --  RR: 18 (08-26-24 @ 00:46) (18 - 18)  SpO2: 95% (08-26-24 @ 00:46) (95% - 98%)    PHYSICAL EXAM:  Constitutional: NAD, comfortable in bed.  HEENT: NC/AT, PERRLA, EOMI, no conjunctival pallor or scleral icterus, MMM  Neck: Supple, no JVD  Respiratory: CTA B/L. No w/r/r.   Cardiovascular: RRR, normal S1 and S2, no m/r/g.   Gastrointestinal: +BS, soft NTND, no guarding or rebound tenderness, no palpable masses   Extremities: wwp; no cyanosis, clubbing or edema.   Vascular: Pulses equal and strong throughout.   Neurological: AAOx3, no CN deficits, strength and sensation intact throughout.   Skin: No gross skin abnormalities or rashes Vital Signs Last 12 Hrs  T(F): 99 (08-26-24 @ 00:46), Max: 99 (08-26-24 @ 00:46)  HR: 70 (08-26-24 @ 00:46) (70 - 87)  BP: 143/83 (08-26-24 @ 00:46) (129/91 - 143/83)  BP(mean): --  RR: 18 (08-26-24 @ 00:46) (18 - 18)  SpO2: 95% (08-26-24 @ 00:46) (95% - 98%)    PHYSICAL EXAM:  Constitutional: NAD, comfortable in bed.  HEENT: NC/AT, PERRLA, EOMI, no conjunctival pallor or scleral icterus, MMM  Neck: Supple, no JVD  Respiratory: CTA B/L. No w/r/r.   Cardiovascular: RRR, normal S1 and S2, no m/r/g.   Gastrointestinal: +BS, soft, tenderness all quadrants, no rebound gaurding.  Extremities: wwp; no cyanosis, clubbing or edema.   Vascular: Pulses equal and strong throughout.   Neurological: AAOx3, no CN deficits, strength and sensation intact throughout.   Skin: No gross skin abnormalities or rashes

## 2024-08-26 NOTE — PROGRESS NOTE ADULT - SUBJECTIVE AND OBJECTIVE BOX
O/N Events:  Subjective/ROS: Denies HA, CP, SOB, n/v, changes in bowel/urinary habits.  12pt ROS otherwise negative.    VITALS  Vital Signs Last 24 Hrs  T(C): 37.1 (26 Aug 2024 06:05), Max: 37.2 (26 Aug 2024 00:46)  T(F): 98.7 (26 Aug 2024 06:05), Max: 99 (26 Aug 2024 00:46)  HR: 79 (26 Aug 2024 06:05) (70 - 87)  BP: 142/86 (26 Aug 2024 06:05) (129/91 - 143/83)  BP(mean): --  RR: 18 (26 Aug 2024 06:05) (18 - 18)  SpO2: 95% (26 Aug 2024 06:05) (95% - 98%)    Parameters below as of 26 Aug 2024 06:05  Patient On (Oxygen Delivery Method): room air        CAPILLARY BLOOD GLUCOSE          PHYSICAL EXAM  General: A&Ox3; NAD  Head: NC/AT; MMM; PERRL; EOMI;  Neck: Supple; no JVD  Respiratory: CTA B/L; no wheezes/crackles   Cardiovascular: Regular rhythm/rate; S1/S2   Gastrointestinal: Soft; NTND; normoactive BS  Extremities: WWP; no edema/cyanosis  Neurological:  CNII-XII grossly intact; no obvious focal deficits    MEDICATIONS  (STANDING):  ARIPiprazole 5 milliGRAM(s) Oral every 24 hours  buPROPion XL (24-Hour) . 150 milliGRAM(s) Oral daily  enoxaparin Injectable 40 milliGRAM(s) SubCutaneous every 24 hours  potassium chloride  10 mEq/100 mL IVPB 10 milliEquivalent(s) IV Intermittent every 1 hour  sertraline 75 milliGRAM(s) Oral every 24 hours  topiramate 25 milliGRAM(s) Oral every 24 hours    MEDICATIONS  (PRN):  acetaminophen     Tablet .. 650 milliGRAM(s) Oral every 6 hours PRN Temp greater or equal to 38C (100.4F), Mild Pain (1 - 3)  clonazePAM  Tablet 0.5 milliGRAM(s) Oral every 12 hours PRN for anxiety  haloperidol    Injectable 2 milliGRAM(s) IV Push every 6 hours PRN nausea      penicillin (Unknown)  Lamictal (Unknown)  Levaquin (Unknown)      LABS                        13.6   18.60 )-----------( 434      ( 25 Aug 2024 23:08 )             40.9     08-25    144  |  108  |  14  ----------------------------<  123<H>  3.2<L>   |  24  |  1.01    Ca    9.4      25 Aug 2024 20:55    TPro  7.7  /  Alb  4.3  /  TBili  1.0  /  DBili  x   /  AST  18  /  ALT  18  /  AlkPhos  60  08-25      Urinalysis Basic - ( 25 Aug 2024 20:55 )    Color: x / Appearance: x / SG: x / pH: x  Gluc: 123 mg/dL / Ketone: x  / Bili: x / Urobili: x   Blood: x / Protein: x / Nitrite: x   Leuk Esterase: x / RBC: x / WBC x   Sq Epi: x / Non Sq Epi: x / Bacteria: x            IMAGING/EKG/ETC  EKG:  Xray:  CT:  MRI: 54 yo female w/ pmh IBS, cyclical vomiting, bipolar d/o, cannabis use, psh cholecystectomy (2022) and splenectomy (15 yo) bibems c/o 2 days of worsening epigastric abdominal pain which is nonradiating associated with multiple episodes of nonbloody nonbilious emesis. Patient  admitted for cyclic vomiting syndrome, and inability to tolerate PO diet.    O/N Events: NBNB emesis overnight, improved this morning.    Subjective/ROS: Endorses improved nausea this morning with no vomiting. States she wants to trial PO intake this morning with jello. Denies HA, CP, SOB, n/v, changes in bowel/urinary habits.  12pt ROS otherwise negative.    VITALS  Vital Signs Last 24 Hrs  T(C): 37.1 (26 Aug 2024 06:05), Max: 37.2 (26 Aug 2024 00:46)  T(F): 98.7 (26 Aug 2024 06:05), Max: 99 (26 Aug 2024 00:46)  HR: 79 (26 Aug 2024 06:05) (70 - 87)  BP: 142/86 (26 Aug 2024 06:05) (129/91 - 143/83)  BP(mean): --  RR: 18 (26 Aug 2024 06:05) (18 - 18)  SpO2: 95% (26 Aug 2024 06:05) (95% - 98%)    Parameters below as of 26 Aug 2024 06:05  Patient On (Oxygen Delivery Method): room air        CAPILLARY BLOOD GLUCOSE          PHYSICAL EXAM  General: A&Ox3; NAD  Head: NC/AT; MMM; PERRL; EOMI;  Neck: Supple; no JVD  Respiratory: CTA B/L; no wheezes/crackles   Cardiovascular: Regular rhythm/rate; S1/S2   Gastrointestinal: Soft; NTND; normoactive BS  Extremities: WWP; no edema/cyanosis  Neurological:  CNII-XII grossly intact; no obvious focal deficits    MEDICATIONS  (STANDING):  ARIPiprazole 5 milliGRAM(s) Oral every 24 hours  buPROPion XL (24-Hour) . 150 milliGRAM(s) Oral daily  enoxaparin Injectable 40 milliGRAM(s) SubCutaneous every 24 hours  potassium chloride  10 mEq/100 mL IVPB 10 milliEquivalent(s) IV Intermittent every 1 hour  sertraline 75 milliGRAM(s) Oral every 24 hours  topiramate 25 milliGRAM(s) Oral every 24 hours    MEDICATIONS  (PRN):  acetaminophen     Tablet .. 650 milliGRAM(s) Oral every 6 hours PRN Temp greater or equal to 38C (100.4F), Mild Pain (1 - 3)  clonazePAM  Tablet 0.5 milliGRAM(s) Oral every 12 hours PRN for anxiety  haloperidol    Injectable 2 milliGRAM(s) IV Push every 6 hours PRN nausea      penicillin (Unknown)  Lamictal (Unknown)  Levaquin (Unknown)      LABS                        13.6   18.60 )-----------( 434      ( 25 Aug 2024 23:08 )             40.9     08-25    144  |  108  |  14  ----------------------------<  123<H>  3.2<L>   |  24  |  1.01    Ca    9.4      25 Aug 2024 20:55    TPro  7.7  /  Alb  4.3  /  TBili  1.0  /  DBili  x   /  AST  18  /  ALT  18  /  AlkPhos  60  08-25      Urinalysis Basic - ( 25 Aug 2024 20:55 )    Color: x / Appearance: x / SG: x / pH: x  Gluc: 123 mg/dL / Ketone: x  / Bili: x / Urobili: x   Blood: x / Protein: x / Nitrite: x   Leuk Esterase: x / RBC: x / WBC x   Sq Epi: x / Non Sq Epi: x / Bacteria: x            IMAGING/EKG/ETC  EKG:  Xray:  CT:  MRI: 54 yo female w/ pmh IBS, cyclical vomiting, bipolar d/o, cannabis use, psh cholecystectomy (2022) and splenectomy (15 yo) bibems c/o 2 days of worsening epigastric abdominal pain which is non-radiating associated with multiple episodes of nonbloody nonbilious emesis. Patient  admitted for cyclic vomiting syndrome, and inability to tolerate PO diet.    O/N Events: NBNB emesis overnight, improved this morning.    Subjective/ROS: Endorses improved nausea this morning with no vomiting. States she wants to trial PO intake this morning with jello. Denies HA, CP, SOB, n/v, changes in bowel/urinary habits.  12pt ROS otherwise negative.    VITALS  Vital Signs Last 24 Hrs  T(C): 37.1 (26 Aug 2024 06:05), Max: 37.2 (26 Aug 2024 00:46)  T(F): 98.7 (26 Aug 2024 06:05), Max: 99 (26 Aug 2024 00:46)  HR: 79 (26 Aug 2024 06:05) (70 - 87)  BP: 142/86 (26 Aug 2024 06:05) (129/91 - 143/83)  BP(mean): --  RR: 18 (26 Aug 2024 06:05) (18 - 18)  SpO2: 95% (26 Aug 2024 06:05) (95% - 98%)    Parameters below as of 26 Aug 2024 06:05  Patient On (Oxygen Delivery Method): room air        CAPILLARY BLOOD GLUCOSE          PHYSICAL EXAM  General: A&Ox3; NAD  Head: NC/AT; MMM; PERRL; EOMI;  Neck: Supple; no JVD  Respiratory: CTA B/L; no wheezes/crackles   Cardiovascular: Regular rhythm/rate; S1/S2   Gastrointestinal: Soft; NTND; normoactive BS  Extremities: WWP; no edema/cyanosis  Neurological:  CNII-XII grossly intact; no obvious focal deficits    MEDICATIONS  (STANDING):  ARIPiprazole 5 milliGRAM(s) Oral every 24 hours  buPROPion XL (24-Hour) . 150 milliGRAM(s) Oral daily  enoxaparin Injectable 40 milliGRAM(s) SubCutaneous every 24 hours  potassium chloride  10 mEq/100 mL IVPB 10 milliEquivalent(s) IV Intermittent every 1 hour  sertraline 75 milliGRAM(s) Oral every 24 hours  topiramate 25 milliGRAM(s) Oral every 24 hours    MEDICATIONS  (PRN):  acetaminophen     Tablet .. 650 milliGRAM(s) Oral every 6 hours PRN Temp greater or equal to 38C (100.4F), Mild Pain (1 - 3)  clonazePAM  Tablet 0.5 milliGRAM(s) Oral every 12 hours PRN for anxiety  haloperidol    Injectable 2 milliGRAM(s) IV Push every 6 hours PRN nausea      penicillin (Unknown)  Lamictal (Unknown)  Levaquin (Unknown)      LABS                        13.6   18.60 )-----------( 434      ( 25 Aug 2024 23:08 )             40.9     08-25    144  |  108  |  14  ----------------------------<  123<H>  3.2<L>   |  24  |  1.01    Ca    9.4      25 Aug 2024 20:55    TPro  7.7  /  Alb  4.3  /  TBili  1.0  /  DBili  x   /  AST  18  /  ALT  18  /  AlkPhos  60  08-25      Urinalysis Basic - ( 25 Aug 2024 20:55 )    Color: x / Appearance: x / SG: x / pH: x  Gluc: 123 mg/dL / Ketone: x  / Bili: x / Urobili: x   Blood: x / Protein: x / Nitrite: x   Leuk Esterase: x / RBC: x / WBC x   Sq Epi: x / Non Sq Epi: x / Bacteria: x            IMAGING/EKG/ETC  EKG:  Xray:  CT:  MRI: 54 yo female w/ pmh IBS, cyclical vomiting, bipolar d/o, cannabis use, psh cholecystectomy (2022) and splenectomy (13 yo) bibems c/o 2 days of worsening epigastric abdominal pain which is non-radiating associated with multiple episodes of nonbloody nonbilious emesis. Patient  admitted for cyclic vomiting syndrome, and inability to tolerate PO diet.    O/N Events: NBNB emesis overnight, improved this morning.    Subjective/ROS: Endorses improved nausea this morning with no vomiting. States she wants to trial PO intake this morning with jello. Denies HA, CP, SOB, n/v, changes in bowel/urinary habits.  12pt ROS otherwise negative.    VITALS  Vital Signs Last 24 Hrs  T(C): 37.1 (26 Aug 2024 06:05), Max: 37.2 (26 Aug 2024 00:46)  T(F): 98.7 (26 Aug 2024 06:05), Max: 99 (26 Aug 2024 00:46)  HR: 79 (26 Aug 2024 06:05) (70 - 87)  BP: 142/86 (26 Aug 2024 06:05) (129/91 - 143/83)  BP(mean): --  RR: 18 (26 Aug 2024 06:05) (18 - 18)  SpO2: 95% (26 Aug 2024 06:05) (95% - 98%)    Parameters below as of 26 Aug 2024 06:05  Patient On (Oxygen Delivery Method): room air        CAPILLARY BLOOD GLUCOSE          PHYSICAL EXAM  General: A&Ox3; NAD  Head: NC/AT; MMM; PERRL; EOMI;  Neck: Supple; no JVD  Respiratory: CTA B/L; no wheezes/crackles   Cardiovascular: Regular rhythm/rate; S1/S2   Gastrointestinal: Soft; NTND; normoactive BS  Extremities: WWP; no edema/cyanosis  Neurological:  CNII-XII grossly intact; no obvious focal deficits    MEDICATIONS  (STANDING):  ARIPiprazole 5 milliGRAM(s) Oral every 24 hours  buPROPion XL (24-Hour) . 150 milliGRAM(s) Oral daily  enoxaparin Injectable 40 milliGRAM(s) SubCutaneous every 24 hours  potassium chloride  10 mEq/100 mL IVPB 10 milliEquivalent(s) IV Intermittent every 1 hour  sertraline 75 milliGRAM(s) Oral every 24 hours  topiramate 25 milliGRAM(s) Oral every 24 hours    MEDICATIONS  (PRN):  acetaminophen     Tablet .. 650 milliGRAM(s) Oral every 6 hours PRN Temp greater or equal to 38C (100.4F), Mild Pain (1 - 3)  clonazePAM  Tablet 0.5 milliGRAM(s) Oral every 12 hours PRN for anxiety  haloperidol    Injectable 2 milliGRAM(s) IV Push every 6 hours PRN nausea      penicillin (Unknown)  Lamictal (Unknown)  Levaquin (Unknown)      LABS                        13.6   18.60 )-----------( 434      ( 25 Aug 2024 23:08 )             40.9     08-25    144  |  108  |  14  ----------------------------<  123<H>  3.2<L>   |  24  |  1.01    Ca    9.4      25 Aug 2024 20:55    TPro  7.7  /  Alb  4.3  /  TBili  1.0  /  DBili  x   /  AST  18  /  ALT  18  /  AlkPhos  60  08-25      Urinalysis Basic - ( 25 Aug 2024 20:55 )    Color: x / Appearance: x / SG: x / pH: x  Gluc: 123 mg/dL / Ketone: x  / Bili: x / Urobili: x   Blood: x / Protein: x / Nitrite: x   Leuk Esterase: x / RBC: x / WBC x   Sq Epi: x / Non Sq Epi: x / Bacteria: x            IMAGING/EKG/ETC    CT:    FINDINGS:  LOWER CHEST: Within normal limits.    LIVER: Redemonstrated steatosis.  BILE DUCTS: Normal caliber.  GALLBLADDER: Cholecystectomy.  SPLEEN: 5 cm left upper quadrant round soft tissue lesion, stable in   size, favored to represent splenosis.  PANCREAS: Within normal limits.  ADRENALS: Within normal limits.  KIDNEYS/URETERS: No renal stones or hydronephrosis. Few stable bilateral   hypodensities, too small to characterize.    BLADDER: Minimally distended.  REPRODUCTIVE ORGANS: Uterus and adnexa within normal limits.    BOWEL: No bowel obstruction. Appendix is normal. Colonic diverticulosis   without diverticulitis. Incidental short segment transient small bowel   intussusception in the lateral left hemiabdomen.  PERITONEUM/RETROPERITONEUM: Within normal limits.  VESSELS: Within normal limits.  LYMPH NODES: No lymphadenopathy.  ABDOMINAL WALL: Stable small fat-containing umbilical hernia. Stable   small midline supraumbilical fat-containing hernia.  BONES: Degenerative changes.    IMPRESSION:  No acute pathology.

## 2024-08-26 NOTE — H&P ADULT - PROBLEM SELECTOR PLAN 3
Pt p/w hypokalemia to 3.2,  Likely in the setting of reduced PO due to cyclic vomiting syndrome.  - Replete as necessary  - Trend BMP

## 2024-08-26 NOTE — H&P ADULT - PROBLEM SELECTOR PLAN 4
Patient w hx of BPD  Home meds: Abilify 5mg qd, Buproprion 200mg BID, Klonopin .5mg BID, Latuda 40mg, Sertraline 50mg qd, Topamax 25mg TID  Plan: Patient w hx of BPD  Home meds: Abilify 5mg qd, Buproprion 200mg qd, Klonopin .5mg BID, Latuda 40mg, Sertraline 75mg qd, Topamax 25mg qhs  Plan:  - C/w home meds  - Daily EKG given multiple QTC prolonging meds

## 2024-08-26 NOTE — PROGRESS NOTE ADULT - ASSESSMENT
52 yo female w/ pmh IBS, cyclical vomiting, bipolar d/o, psh cholecystectomy (2022) and splenectomy (13 yo) bibems c/o 2 days of worsening epigastric abdominal pain which is nonradiating associated with multiple episodes of nonbloody nonbilious emesis. Patient being admitted for cyclic vomiting syndrome, and inability to tolerate PO diet. 54 yo female w/ pmh IBS, cyclical vomiting, bipolar d/o, cannabis use, psh cholecystectomy (2022) and splenectomy (13 yo) bibems c/o 2 days of worsening epigastric abdominal pain which is nonradiating associated with multiple episodes of nonbloody nonbilious emesis. Patient being admitted for cyclic vomiting syndrome, and inability to tolerate PO diet.

## 2024-08-26 NOTE — H&P ADULT - ATTENDING COMMENTS
Ms. Palafox is a 52yo woman with PMHx cannabis use, cyclic vomiting disorder, bipolar disorder, ITP s/p splenectomy, recent admission to OSH for vomiting who presented for inability to tolerate PO and recurrent vomiting. Patient is a daily marijuana user and has multiple admissions for likely cannabis hyperemesis syndrome. CT A/P no e/o obstruction.    Patient seen this AM, reports able to drink water without vomiting, will trial liquid diet today. Continuing home anti-psychotics and will trial IV Ativan for nausea/emesis. Repleting lytes. Labs notable for leukocytosis to 18, however no other signs/symptoms of infection. Will monitor. Dispo pending ability to tolerate PO. Will encourage reduction in cannabis use.

## 2024-08-26 NOTE — H&P ADULT - PROBLEM SELECTOR PLAN 1
Pt p/w cyclic vomiting syndrome  Last year w similar admission 2/2 to believed cannabis use.  Relief w cocktail provided in ED (haldol, ativan, benadryl, zofran). Not reliably tolerating PO, and patient is reluctant so admitted to medicine  Plan:  - Zofran 4mg IV q8PRN  - Haldol 2mg IV q6PRN  - Will admin 1L additional NS, appear dry on exam  - Regular diet ordered, see how patient tolerates in AM Pt p/w cyclic vomiting syndrome  Last year w similar admission 2/2 to believed cannabis use.  Relief w cocktail provided in ED (haldol, ativan, benadryl, zofran). Not reliably tolerating PO, and patient is reluctant so admitted to medicine  Plan:  - Haldol 2mg IV q6PRN  - Can add reglan as patient reports that also provides relief, zofran w minimal relief as per pt.  - Will admin 1L additional NS, appear dry on exam  - Regular diet ordered, see how patient tolerates in AM Pt p/w cyclic vomiting syndrome  Last year w similar admission 2/2 to believed cannabis use.  Relief w cocktail provided in ED (haldol, ativan, benadryl, zofran). Not reliably tolerating PO, and patient is reluctant so admitted to medicine  Plan:  - Haldol 2mg IV q6PRN  - Can add reglan as patient reports that also provides relief, zofran w minimal relief as per pt.  - Regular diet ordered, see how patient tolerates in AM

## 2024-08-26 NOTE — PROGRESS NOTE ADULT - ATTENDING COMMENTS
Ms. Palafox is a 54yo woman with PMHx cannabis use, cyclic vomiting disorder, bipolar disorder, ITP s/p splenectomy, recent admission to OSH for vomiting who presented for inability to tolerate PO and recurrent vomiting. Patient is a daily marijuana user and has multiple admissions for likely cannabis hyperemesis syndrome. CT A/P no e/o obstruction.    Patient seen this AM, reports able to drink water without vomiting, will trial liquid diet today. Continuing home anti-psychotics and will trial IV Ativan for nausea/emesis. Repleting lytes. Labs notable for leukocytosis to 18, however no other signs/symptoms of infection. Will monitor. Dispo pending ability to tolerate PO. Will encourage reduction in cannabis use.

## 2024-08-26 NOTE — PROGRESS NOTE ADULT - PROBLEM SELECTOR PLAN 4
Patient w hx of BPD  Home meds: Abilify 5mg qd, Buproprion 200mg qd, Klonopin .5mg BID, Latuda 40mg, Sertraline 75mg qd, Topamax 25mg qhs  Plan:  - C/w home meds  - Daily EKG given multiple QTC prolonging meds Patient w hx of BPD  Home meds: Abilify 5mg qd, Buproprion 200mg qd, Klonopin .5mg BID, Latuda 40mg, Sertraline 75mg qd, Topamax 25mg qhs  Plan:  - C/w home meds  - Daily EKG given multiple QTC prolonging meds  - EKG 8/26 QTc: 444

## 2024-08-27 VITALS
DIASTOLIC BLOOD PRESSURE: 77 MMHG | TEMPERATURE: 99 F | RESPIRATION RATE: 17 BRPM | SYSTOLIC BLOOD PRESSURE: 117 MMHG | OXYGEN SATURATION: 96 % | HEART RATE: 80 BPM

## 2024-08-27 LAB
ALBUMIN SERPL ELPH-MCNC: 3.8 G/DL — SIGNIFICANT CHANGE UP (ref 3.3–5)
ALP SERPL-CCNC: 50 U/L — SIGNIFICANT CHANGE UP (ref 40–120)
ALT FLD-CCNC: 16 U/L — SIGNIFICANT CHANGE UP (ref 10–45)
ANION GAP SERPL CALC-SCNC: 6 MMOL/L — SIGNIFICANT CHANGE UP (ref 5–17)
AST SERPL-CCNC: 17 U/L — SIGNIFICANT CHANGE UP (ref 10–40)
BILIRUB SERPL-MCNC: 0.9 MG/DL — SIGNIFICANT CHANGE UP (ref 0.2–1.2)
BUN SERPL-MCNC: 12 MG/DL — SIGNIFICANT CHANGE UP (ref 7–23)
CALCIUM SERPL-MCNC: 8.9 MG/DL — SIGNIFICANT CHANGE UP (ref 8.4–10.5)
CHLORIDE SERPL-SCNC: 104 MMOL/L — SIGNIFICANT CHANGE UP (ref 96–108)
CO2 SERPL-SCNC: 27 MMOL/L — SIGNIFICANT CHANGE UP (ref 22–31)
CREAT SERPL-MCNC: 0.98 MG/DL — SIGNIFICANT CHANGE UP (ref 0.5–1.3)
EGFR: 68 ML/MIN/1.73M2 — SIGNIFICANT CHANGE UP
GLUCOSE SERPL-MCNC: 96 MG/DL — SIGNIFICANT CHANGE UP (ref 70–99)
HCT VFR BLD CALC: 40.8 % — SIGNIFICANT CHANGE UP (ref 34.5–45)
HGB BLD-MCNC: 13.4 G/DL — SIGNIFICANT CHANGE UP (ref 11.5–15.5)
MAGNESIUM SERPL-MCNC: 2.3 MG/DL — SIGNIFICANT CHANGE UP (ref 1.6–2.6)
MCHC RBC-ENTMCNC: 31.4 PG — SIGNIFICANT CHANGE UP (ref 27–34)
MCHC RBC-ENTMCNC: 32.8 GM/DL — SIGNIFICANT CHANGE UP (ref 32–36)
MCV RBC AUTO: 95.6 FL — SIGNIFICANT CHANGE UP (ref 80–100)
NRBC # BLD: 0 /100 WBCS — SIGNIFICANT CHANGE UP (ref 0–0)
PHOSPHATE SERPL-MCNC: 3.7 MG/DL — SIGNIFICANT CHANGE UP (ref 2.5–4.5)
PLATELET # BLD AUTO: 437 K/UL — HIGH (ref 150–400)
POTASSIUM SERPL-MCNC: 3.6 MMOL/L — SIGNIFICANT CHANGE UP (ref 3.5–5.3)
POTASSIUM SERPL-SCNC: 3.6 MMOL/L — SIGNIFICANT CHANGE UP (ref 3.5–5.3)
PROT SERPL-MCNC: 6.9 G/DL — SIGNIFICANT CHANGE UP (ref 6–8.3)
RBC # BLD: 4.27 M/UL — SIGNIFICANT CHANGE UP (ref 3.8–5.2)
RBC # FLD: 13.7 % — SIGNIFICANT CHANGE UP (ref 10.3–14.5)
SODIUM SERPL-SCNC: 137 MMOL/L — SIGNIFICANT CHANGE UP (ref 135–145)
WBC # BLD: 15.34 K/UL — HIGH (ref 3.8–10.5)
WBC # FLD AUTO: 15.34 K/UL — HIGH (ref 3.8–10.5)

## 2024-08-27 PROCEDURE — 96375 TX/PRO/DX INJ NEW DRUG ADDON: CPT

## 2024-08-27 PROCEDURE — 96372 THER/PROPH/DIAG INJ SC/IM: CPT

## 2024-08-27 PROCEDURE — 83735 ASSAY OF MAGNESIUM: CPT

## 2024-08-27 PROCEDURE — 93005 ELECTROCARDIOGRAM TRACING: CPT

## 2024-08-27 PROCEDURE — 80053 COMPREHEN METABOLIC PANEL: CPT

## 2024-08-27 PROCEDURE — 84100 ASSAY OF PHOSPHORUS: CPT

## 2024-08-27 PROCEDURE — 80048 BASIC METABOLIC PNL TOTAL CA: CPT

## 2024-08-27 PROCEDURE — 96374 THER/PROPH/DIAG INJ IV PUSH: CPT

## 2024-08-27 PROCEDURE — 99239 HOSP IP/OBS DSCHRG MGMT >30: CPT

## 2024-08-27 PROCEDURE — 83605 ASSAY OF LACTIC ACID: CPT

## 2024-08-27 PROCEDURE — 83690 ASSAY OF LIPASE: CPT

## 2024-08-27 PROCEDURE — 85025 COMPLETE CBC W/AUTO DIFF WBC: CPT

## 2024-08-27 PROCEDURE — 84484 ASSAY OF TROPONIN QUANT: CPT

## 2024-08-27 PROCEDURE — 99285 EMERGENCY DEPT VISIT HI MDM: CPT

## 2024-08-27 PROCEDURE — 74177 CT ABD & PELVIS W/CONTRAST: CPT | Mod: MC

## 2024-08-27 PROCEDURE — 36415 COLL VENOUS BLD VENIPUNCTURE: CPT

## 2024-08-27 PROCEDURE — 85027 COMPLETE CBC AUTOMATED: CPT

## 2024-08-27 RX ORDER — SERTRALINE HYDROCHLORIDE 50 MG/1
3 TABLET, FILM COATED ORAL
Qty: 0 | Refills: 0 | DISCHARGE
Start: 2024-08-27

## 2024-08-27 RX ORDER — CELECOXIB 400 MG/1
1 CAPSULE ORAL
Qty: 0 | Refills: 0 | DISCHARGE
Start: 2024-08-27

## 2024-08-27 RX ORDER — POTASSIUM CHLORIDE 10 MEQ
40 TABLET, EXT RELEASE, PARTICLES/CRYSTALS ORAL ONCE
Refills: 0 | Status: DISCONTINUED | OUTPATIENT
Start: 2024-08-27 | End: 2024-08-27

## 2024-08-27 RX ORDER — CELECOXIB 400 MG/1
200 CAPSULE ORAL EVERY 24 HOURS
Refills: 0 | Status: DISCONTINUED | OUTPATIENT
Start: 2024-08-27 | End: 2024-08-27

## 2024-08-27 RX ORDER — CLONAZEPAM 1 MG
0.5 TABLET ORAL ONCE
Refills: 0 | Status: DISCONTINUED | OUTPATIENT
Start: 2024-08-27 | End: 2024-08-27

## 2024-08-27 RX ADMIN — Medication 0.5 MILLIGRAM(S): at 11:15

## 2024-08-27 RX ADMIN — BUPROPION HYDROCHLORIDE 150 MILLIGRAM(S): 150 TABLET ORAL at 10:34

## 2024-08-27 RX ADMIN — CELECOXIB 200 MILLIGRAM(S): 400 CAPSULE ORAL at 00:00

## 2024-08-27 RX ADMIN — SERTRALINE HYDROCHLORIDE 75 MILLIGRAM(S): 50 TABLET, FILM COATED ORAL at 06:17

## 2024-08-27 RX ADMIN — CELECOXIB 200 MILLIGRAM(S): 400 CAPSULE ORAL at 00:13

## 2024-08-27 RX ADMIN — ENOXAPARIN SODIUM 40 MILLIGRAM(S): 100 INJECTION SUBCUTANEOUS at 10:12

## 2024-08-27 RX ADMIN — LORAZEPAM 1 MILLIGRAM(S): 4 INJECTION INTRAMUSCULAR; INTRAVENOUS at 00:10

## 2024-08-27 RX ADMIN — CELECOXIB 200 MILLIGRAM(S): 400 CAPSULE ORAL at 10:34

## 2024-08-27 RX ADMIN — ARIPIPRAZOLE 5 MILLIGRAM(S): 15 TABLET ORAL at 10:34

## 2024-08-27 NOTE — PROGRESS NOTE ADULT - PROBLEM SELECTOR PLAN 4
Patient w hx of BPD  Home meds: Abilify 5mg qd, Buproprion 200mg qd, Klonopin .5mg BID, Latuda 40mg, Sertraline 75mg qd, Topamax 25mg qhs  Plan:  - C/w home meds  - Daily EKG given multiple QTC prolonging meds  - EKG 8/26 QTc: 444

## 2024-08-27 NOTE — DISCHARGE NOTE NURSING/CASE MANAGEMENT/SOCIAL WORK - PATIENT PORTAL LINK FT
You can access the FollowMyHealth Patient Portal offered by [facility] by registering at the following website: [email or web address]. By joining [facility] [facility]’s FollowMyHealth portal, you will also be able to view your health information using other applications (apps) compatible with our system.

## 2024-08-27 NOTE — DISCHARGE NOTE PROVIDER - NSDCCPCAREPLAN_GEN_ALL_CORE_FT
PRINCIPAL DISCHARGE DIAGNOSIS  Diagnosis: Cyclic vomiting syndrome  Assessment and Plan of Treatment: You were admitted for cyclic vomiting syndrome and inability to tolerate oral diet. You reported you have had this issue before in the past. We controlled your nausea and repleted your electrolytes as necessary until you were able to tolerate liquids and solid food without vomiting. We recommend hot showers if symptoms return.     PRINCIPAL DISCHARGE DIAGNOSIS  Diagnosis: Cyclic vomiting syndrome  Assessment and Plan of Treatment: You were admitted for cyclic vomiting syndrome and inability to tolerate oral diet. You reported you have had this issue before in the past. We controlled your nausea and repleted your electrolytes as necessary until you were able to tolerate liquids and solid food without vomiting. We recommend hot showers if symptoms return.  Please follow up with your primary care doctor.

## 2024-08-27 NOTE — PROGRESS NOTE ADULT - ASSESSMENT
54 yo female w/ pmh IBS, cyclical vomiting, bipolar d/o, cannabis use, psh cholecystectomy (2022) and splenectomy (13 yo) bibems c/o 2 days of worsening epigastric abdominal pain which is nonradiating associated with multiple episodes of nonbloody nonbilious emesis. Patient being admitted for cyclic vomiting syndrome, and inability to tolerate PO diet.

## 2024-08-27 NOTE — DISCHARGE NOTE PROVIDER - CARE PROVIDER_API CALL
ANISH FITZGERALD [address], [address]  [place] [address]  Phone: [phone]  Fax: ()-  Follow Up Time:    ANISH FITZGERALD [address], [address]  [place] [address]  Phone: ()-  Fax: ()-  Follow Up Time: 1 week

## 2024-08-27 NOTE — DISCHARGE NOTE PROVIDER - NSDCFUADDAPPT_GEN_ALL_CORE_FT
Pain management appt 9/5 Please follow up with your primary care doctor within 2 weeks for cyclic vomiting syndrome. Please call their office to schedule an appointment.    Please follow up with your Pain management appt for 9/5.

## 2024-08-27 NOTE — PROGRESS NOTE ADULT - PROBLEM SELECTOR PLAN 5
F: S/p 1 L NS  E: Replete K>4, Mg>2  N: Regular Diet    DVT prophylaxis: Lovenox 40mg qd
F: S/p 1 L NS  E: Replete K>4, Mg>2  N: Regular Diet    DVT prophylaxis: Lovenox 40mg qd

## 2024-08-27 NOTE — DISCHARGE NOTE NURSING/CASE MANAGEMENT/SOCIAL WORK - NSDCPEFALRISK_GEN_ALL_CORE
For information on Fall & Injury Prevention, visit: [email or web address] OR  [email or web address] OR  https://www.cdc.gov/steadi/patient.html

## 2024-08-27 NOTE — PROGRESS NOTE ADULT - PROBLEM SELECTOR PLAN 3
Pt p/w hypokalemia to 3.2,  Likely in the setting of reduced PO due to cyclic vomiting syndrome.  - Replete as necessary  - Trend BMP
Pt p/w hypokalemia to 3.2,  Likely in the setting of reduced PO due to cyclic vomiting syndrome.  - Replete as necessary  - Trend BMP

## 2024-08-27 NOTE — DISCHARGE NOTE PROVIDER - ATTENDING DISCHARGE PHYSICAL EXAMINATION:
******************************************************  ATTENDING ATTESTATION    I have read and agree with the resident Discharge Note above. Patient seen and discussed with resident team on the day of discharge.     Briefly, Ms. Palafox is a 52yo woman with PMHx cannabis use, cyclic vomiting disorder, bipolar disorder, ITP s/p splenectomy, recent admission to OSH for vomiting who presented for inability to tolerate PO and recurrent vomiting, admitted for management of cyclic vomiting disorder. CT A/P no e/o obstruction. Vomiting improved with IV ativan. Able to tolerate PO on discharge. Counseled patient to stop cannabis use.       Physical Exam:  T(C): 37  HR: 80  BP: 117/77  RR: 17  SpO2: 96%    Gen: sitting upright in bed at time of exam  HEENT: NCAT, MMM, clear OP  Neck: supple, trachea at midline  CV: RRR, +S1/S2  Pulm: adequate respiratory effort, no increased work of breathing  Abd: soft, NTND  Skin: warm and dry  Ext: WWP  Neuro: AOx3, no gross focal neurological deficits  Psych: affect and behavior appropriate    I was physically present for the evaluation and management services provided. I agree with the included history, physical, and plan which I reviewed and edited where appropriate. I spent 33 minutes on direct patient care and discharge planning with more than 50% of the visit spent on counseling and/or coordination of care.

## 2024-08-27 NOTE — PROGRESS NOTE ADULT - SUBJECTIVE AND OBJECTIVE BOX
54 yo female w/ pmh IBS, cyclical vomiting, bipolar d/o, cannabis use, psh cholecystectomy (2022) and splenectomy (13 yo) bibems c/o 2 days of worsening epigastric abdominal pain which is non-radiating associated with multiple episodes of nonbloody nonbilious emesis. Patient  admitted for cyclic vomiting syndrome, and inability to tolerate PO diet.    O/N Events:     Subjective/ROS:     12pt ROS otherwise negative.    VITALS  Vital Signs Last 24 Hrs  T(C): 37.1 (26 Aug 2024 06:05), Max: 37.2 (26 Aug 2024 00:46)  T(F): 98.7 (26 Aug 2024 06:05), Max: 99 (26 Aug 2024 00:46)  HR: 79 (26 Aug 2024 06:05) (70 - 87)  BP: 142/86 (26 Aug 2024 06:05) (129/91 - 143/83)  BP(mean): --  RR: 18 (26 Aug 2024 06:05) (18 - 18)  SpO2: 95% (26 Aug 2024 06:05) (95% - 98%)    Parameters below as of 26 Aug 2024 06:05  Patient On (Oxygen Delivery Method): room air        CAPILLARY BLOOD GLUCOSE          PHYSICAL EXAM  General: A&Ox3; NAD  Head: NC/AT; MMM; PERRL; EOMI;  Neck: Supple; no JVD  Respiratory: CTA B/L; no wheezes/crackles   Cardiovascular: Regular rhythm/rate; S1/S2   Gastrointestinal: Soft; NTND; normoactive BS  Extremities: WWP; no edema/cyanosis  Neurological:  CNII-XII grossly intact; no obvious focal deficits    MEDICATIONS  (STANDING):  ARIPiprazole 5 milliGRAM(s) Oral every 24 hours  buPROPion XL (24-Hour) . 150 milliGRAM(s) Oral daily  enoxaparin Injectable 40 milliGRAM(s) SubCutaneous every 24 hours  potassium chloride  10 mEq/100 mL IVPB 10 milliEquivalent(s) IV Intermittent every 1 hour  sertraline 75 milliGRAM(s) Oral every 24 hours  topiramate 25 milliGRAM(s) Oral every 24 hours    MEDICATIONS  (PRN):  acetaminophen     Tablet .. 650 milliGRAM(s) Oral every 6 hours PRN Temp greater or equal to 38C (100.4F), Mild Pain (1 - 3)  clonazePAM  Tablet 0.5 milliGRAM(s) Oral every 12 hours PRN for anxiety  haloperidol    Injectable 2 milliGRAM(s) IV Push every 6 hours PRN nausea      penicillin (Unknown)  Lamictal (Unknown)  Levaquin (Unknown)      LABS                        13.6   18.60 )-----------( 434      ( 25 Aug 2024 23:08 )             40.9     08-25    144  |  108  |  14  ----------------------------<  123<H>  3.2<L>   |  24  |  1.01    Ca    9.4      25 Aug 2024 20:55    TPro  7.7  /  Alb  4.3  /  TBili  1.0  /  DBili  x   /  AST  18  /  ALT  18  /  AlkPhos  60  08-25      Urinalysis Basic - ( 25 Aug 2024 20:55 )    Color: x / Appearance: x / SG: x / pH: x  Gluc: 123 mg/dL / Ketone: x  / Bili: x / Urobili: x   Blood: x / Protein: x / Nitrite: x   Leuk Esterase: x / RBC: x / WBC x   Sq Epi: x / Non Sq Epi: x / Bacteria: x            IMAGING/EKG/ETC    CT:    FINDINGS:  LOWER CHEST: Within normal limits.    LIVER: Redemonstrated steatosis.  BILE DUCTS: Normal caliber.  GALLBLADDER: Cholecystectomy.  SPLEEN: 5 cm left upper quadrant round soft tissue lesion, stable in   size, favored to represent splenosis.  PANCREAS: Within normal limits.  ADRENALS: Within normal limits.  KIDNEYS/URETERS: No renal stones or hydronephrosis. Few stable bilateral   hypodensities, too small to characterize.    BLADDER: Minimally distended.  REPRODUCTIVE ORGANS: Uterus and adnexa within normal limits.    BOWEL: No bowel obstruction. Appendix is normal. Colonic diverticulosis   without diverticulitis. Incidental short segment transient small bowel   intussusception in the lateral left hemiabdomen.  PERITONEUM/RETROPERITONEUM: Within normal limits.  VESSELS: Within normal limits.  LYMPH NODES: No lymphadenopathy.  ABDOMINAL WALL: Stable small fat-containing umbilical hernia. Stable   small midline supraumbilical fat-containing hernia.  BONES: Degenerative changes.    IMPRESSION:  No acute pathology.

## 2024-08-27 NOTE — PROGRESS NOTE ADULT - PROBLEM SELECTOR PLAN 1
Pt p/w cyclic vomiting syndrome  Last year w similar admission 2/2 to believed cannabis use.  Relief w cocktail provided in ED (haldol, ativan, benadryl, zofran). Not reliably tolerating PO, and patient is reluctant so admitted to medicine  Plan:  - discontinued haldol & klonipin, switch to IV ativan PRN to help tolerate home medications   - Can add reglan as patient reports that also provides relief, zofran w minimal relief as per pt.  - Regular diet ordered, see how patient tolerates this AM

## 2024-08-27 NOTE — DISCHARGE NOTE PROVIDER - NSDCQMERRANDS_GEN_ALL_CORE
Reason for Disposition   Caller requesting lab results    Protocols used: ST PCP CALL - NO TRIAGE-A-AH       No

## 2024-08-27 NOTE — DISCHARGE NOTE PROVIDER - HOSPITAL COURSE
Hospital course:  53y Female with PMH       Physical exam at discharge:    New medications on discharge:  Labs to be followed up:  Imaging to be done as outpatient:  Further outpatient workup:   Hospital course:  52 F pmh IBS, cyclical vomiting, bipolar d/o, psh cholecystectomy (2022) and splenectomy (15 yo) bibems c/o abd pain and NV since 10pm last night.  pt reports upper abd pain, sharp/burning and nonradiating.  + nbnb emesis x8 episodes, unable to tolerate PO.  reports typical of her cyclic vomiting. Patient states she was hospitalized with cyclic vomiting 13 times in the year of 2022.  last episode last July.  reports haldol is all that works.  typically goes to [facility] for care.  Denies fever, chills, headache, dizziness, fainting, chest pain, sob, diarrhea, constipation, urinary sxs, sick contacts, travel, trauma. last BM 1 day ago.     Cyclical vomiting.   Pt p/w cyclic vomiting syndrome  Last year w similar admission 2/2 to believed cannabis use.  Relief w cocktail provided in ED (haldol, ativan, benadryl, zofran). Not reliably tolerating PO, and patient is reluctant so admitted to medicine.     Leukocytosis.   Pt p/w leukocytosis to 18.6k, likely reactive to cyclic vomiting syndrome. Last admission had similar leukocytosis.    Hypokalemia.   Pt p/w hypokalemia to 3.2, likely in the setting of reduced PO due to cyclic vomiting syndrome. Monitored and replete as necessary.     Bipolar disorder.   Patient w hx of BPD. Home meds: Abilify 5mg qd, Buproprion 200mg qd, Klonopin .5mg BID, Latuda 40mg, Sertraline 75mg qd, Topamax 25mg qhs    Physical exam at discharge:  General: A&Ox3; NAD  Head: NC/AT; MMM; PERRL; EOMI;  Neck: Supple; no JVD  Respiratory: CTA B/L; no wheezes/crackles   Cardiovascular: Regular rhythm/rate; S1/S2   Gastrointestinal: Soft; NTND; normoactive BS  Extremities: WWP; no edema/cyanosis  Neurological:  CNII-XII grossly intact; no obvious focal deficits    New medications on discharge: none  Labs to be followed up: none   Imaging to be done as outpatient: none   Further outpatient workup: none    Hospital course:  52 F pmh IBS, cyclical vomiting, bipolar d/o, psh cholecystectomy (2022) and splenectomy (15 yo) bibems c/o abd pain and NV since 10pm last night.  pt reports upper abd pain, sharp/burning and nonradiating.  + nbnb emesis x8 episodes, unable to tolerate PO.  reports typical of her cyclic vomiting. Patient states she was hospitalized with cyclic vomiting 13 times in the year of 2022.  last episode last July.  reports haldol is all that works.  typically goes to [facility] for care.  Denies fever, chills, headache, dizziness, fainting, chest pain, sob, diarrhea, constipation, urinary sxs, sick contacts, travel, trauma. last BM 1 day ago.     Cyclical vomiting.   Pt p/w cyclic vomiting syndrome  Last year w similar admission 2/2 to believed cannabis use.  Relief w cocktail provided in ED (haldol, ativan, benadryl, zofran).     Leukocytosis.   Pt p/w leukocytosis to 18.6k, likely reactive to cyclic vomiting syndrome. Last admission had similar leukocytosis.    Hypokalemia.   Pt p/w hypokalemia to 3.2, likely in the setting of reduced PO due to cyclic vomiting syndrome.     Bipolar disorder.   Patient w hx of BPD. Home meds: Abilify 5mg qd, Buproprion 200mg qd, Klonopin .5mg BID, Latuda 40mg, Sertraline 75mg qd, Topamax 25mg qhs    Physical exam at discharge:  General: A&Ox3; NAD  Head: NC/AT; MMM; PERRL; EOMI;  Neck: Supple; no JVD  Respiratory: CTA B/L; no wheezes/crackles   Cardiovascular: Regular rhythm/rate; S1/S2   Gastrointestinal: Soft; NTND; normoactive BS  Extremities: WWP; no edema/cyanosis  Neurological:  CNII-XII grossly intact; no obvious focal deficits    New medications on discharge: none  Labs to be followed up: none   Imaging to be done as outpatient: none   Further outpatient workup: none

## 2024-08-27 NOTE — DISCHARGE NOTE NURSING/CASE MANAGEMENT/SOCIAL WORK - NSDCFUADDAPPT_GEN_ALL_CORE_FT
Please follow up with your primary care doctor within 2 weeks for cyclic vomiting syndrome. Please call their office to schedule an appointment.    Please follow up with your Pain management appt for 9/5.

## 2024-08-27 NOTE — DISCHARGE NOTE PROVIDER - NSDCMRMEDTOKEN_GEN_ALL_CORE_FT
ARIPiprazole 5 mg oral tablet: 1 tab(s) orally once a day  BUPROPION SR 200MG TABLETS (12 HR): TAKE 1 TABLET BY MOUTH TWICE DAILY  CLONAZEPAM 0.5MG TABLETS: 1 tab(s) orally every 12 hours as needed for  anxiety TAKE 2 AND 1/2 TABLET BY MOUTH ONCE DAILY  Latuda 40 mg oral tablet: 1 tab(s) orally once a day (at bedtime)  SERTRALINE HCL  50 MG TABS:   TOPIRAMATE 25MG TABLETS: 1 tab(s) orally once a day TAKE 1 TABLET BY MOUTH THREE TIMES DAILY   ARIPiprazole 5 mg oral tablet: 1 tab(s) orally once a day  BUPROPION SR 200MG TABLETS (12 HR): 1 tab(s) orally once a day TAKE 1 TABLET BY MOUTH TWICE DAILY  celecoxib 200 mg oral capsule: 1 cap(s) orally every 24 hours  CLONAZEPAM 0.5MG TABLETS: 1 tab(s) orally every 12 hours as needed for  anxiety TAKE 2 AND 1/2 TABLET BY MOUTH ONCE DAILY  Latuda 40 mg oral tablet: 1 tab(s) orally once a day (at bedtime)  sertraline 25 mg oral tablet: 3 tab(s) orally every 24 hours  TOPIRAMATE 25MG TABLETS: 1 tab(s) orally once a day TAKE 1 TABLET BY MOUTH THREE TIMES DAILY

## 2024-09-04 DIAGNOSIS — Z90.81 ACQUIRED ABSENCE OF SPLEEN: ICD-10-CM

## 2024-09-04 DIAGNOSIS — Z90.49 ACQUIRED ABSENCE OF OTHER SPECIFIED PARTS OF DIGESTIVE TRACT: ICD-10-CM

## 2024-09-04 DIAGNOSIS — E87.6 HYPOKALEMIA: ICD-10-CM

## 2024-09-04 DIAGNOSIS — F31.9 BIPOLAR DISORDER, UNSPECIFIED: ICD-10-CM

## 2024-09-04 DIAGNOSIS — R11.15 CYCLICAL VOMITING SYNDROME UNRELATED TO MIGRAINE: ICD-10-CM

## 2024-09-04 DIAGNOSIS — K58.9 IRRITABLE BOWEL SYNDROME WITHOUT DIARRHEA: ICD-10-CM

## 2024-09-04 DIAGNOSIS — F12.90 CANNABIS USE, UNSPECIFIED, UNCOMPLICATED: ICD-10-CM

## 2024-09-04 DIAGNOSIS — D72.829 ELEVATED WHITE BLOOD CELL COUNT, UNSPECIFIED: ICD-10-CM

## 2024-09-04 DIAGNOSIS — Z88.0 ALLERGY STATUS TO PENICILLIN: ICD-10-CM

## 2024-09-04 DIAGNOSIS — Z88.8 ALLERGY STATUS TO OTHER DRUGS, MEDICAMENTS AND BIOLOGICAL SUBSTANCES: ICD-10-CM

## 2024-12-02 NOTE — ED ADULT NURSE NOTE - CAS TRG GEN SKIN COLOR
This [age]-year-old boy was seen at the request of Shanika Brady MD, for evaluation of a suspected tic disorder.    HISTORY OF PRESENT ILLNESS:. Chester was in his usual state of good health until November of 2023 when he experienced the onset of fever, congestion, cough, and chest pain.for which he was seen in [facility] on 11/10/23. Although afebrile with normal chest exam, PA and lateral chest radiographs were interpreted as \"Lungs are mildly hyperinflated,with central bronchial wall thickening and diffuse interstitial prominence.Superimposed patchy opacity in the perihilar left upper lobe.\" Chester was diagnosed with suspected left upper lobe pneumonia. And treated with a ten day course of amoxicillin. An albuterol inhaler was also prescribed as needed. After a few weeks of improvement in respiratory symptoms, Chester developed a persistent cough and chest pain for which he was seen in [facility] on 12/19/23. \"Scattered crackles and wheezing\" were reported on chest exam. Chest radiographs were reported to show \"mild increase in perihilar opacities and mild thickening of the central bronchial walls in both lungs\". Resolution of the previously noted opacity in the left upper lobe was also reported. Chester was diagnosed with bronchiolitis secondary to \"a lower respiratory viral illness and/or reactive airway disease\". For which he was treated with a five day course of oral prednisolone and provided with a prescription for an ipratropium-albuterol (Duoneb) inhaler. Persistent cough and wheezing prompted another visit to Urgent Care on 12/24/23 at which time Chester was given a five day course of azithromycin.    When seen by Dr. Brady on 1/4/24, persistent cough was reported despite use of his albuterol inhaler four times each day. Physical findings at that time included a chest exam that was described as \"clear to auscultation bilaterally, good air movement, no increased work of  breathing on room air\". In Dr. Brady's assessment, Ulisess cough was described as \"multifactorial\", including post-nasal drip, exercise induced asthma, and behavioral.\" Mother was encouraged to \"try to distract Chester when he is coughing and if this works, do not need to use albuterol\" and \"Decrease albuterol use to prior to exercise and as needed for prolonged coughing episodes.\" With this advice, Ulisess chronic cough gradually resolved. In its place, however, Chester began to exhibit repetitive blinking in January of 2024. This repetitive blinking gradually subsided in March of 2024 but became more frequent toward the end of summer and the beginning of school in September.    Chester was seen in Dr. Brady's office on 10/10/24 with the following history:    \"Mom noticed some eye blinking starting last winter. Was very sporadic. Now has been happening all day, every day. Very consistent. He will have episodes where he shakes his foot on both sides. No neck movements. No rhythmic shaking of extremities. Able to stop movement voluntarily.He knows he is blinking, but doesn't seem bothered by it. Teacher noticed it as well. No non-responsive periods, no confusion. No family history of seizure disorder.\"    Physical examination on 1010/24 was within normal limits with no abnormal neurological findings. Based on the foregoing history, Dr. Brady summarized her assessment and plan as follows:    \"Chester Najera is a 2 year old male presenting for increasing blinking episodes. Discussed that given the long time course, this is most likely due to underlying tic disorder. Chester has not complained of his eyes being itchy or watery, normal exam today.\"    A referral to pediatric neurology was initiated on 10/10/24, and scheduled for today.     Chester was seen by Laurie Dewitt MD, in the [facility] on 10/29/24 for evaluation of a previously reported concern regarding decreased color vision and recent onset  of excessive blinking. A comprehensive eye exam performed by Dr. Dewitt was normal with the exception of a mild refractive error for which glasses were not prescribed. Color vision and ocular motility, as well normal anterior and posterior segment exams were also normal. Dr. Dewitt's assessment was summarized as follows:    \"Excessive Blinking  -most likely a motor tic; seen by technician during work up, but not seen by me during my portion of exam  -mom does not feel he has any issues with vision  -anterior and posterior segment exam normal without findings that could causing blinking  -no need for glasses     Concerns for decreased color vision:   -teacher with concerns about color vision, here for 6 month follow up  -no visual symptoms, no increased light sensitivity, no poor visual behavior   -good vision, now 14/14 on ishihara testing with tracing numbers today at two different visits   -normal anterior and posterior segment exam   -OCT macula at last appointment with normal ONL and IS/OS junction\"    Additional concerns discussed with Dr. Brady during Chester's Well Child Visit on 10/30/24 included the following:     \"Has been having some trouble with patterns, sometimes puts letters in the wrong order.. Overall doing well in school, but having difficulty with identifying certain letter and patterns. Will sometimes switch letters in his name around.\"     Based on concern regarding a possible learning disability, a pediatric neuropsychology evaluation was recommended but not yet scheduled.    PAST MEDICAL HISTORY: Chester has a history of recurrent otitis media and presumed eustachian tube dysfunction for which bilateral myringotomy and tympanostomy tube placement were performed on 9/19/22 at 35 months of age. Extrusion of the original tympanostomy tubes and the occurrence of recurrent otitis media prompted placement of a second set of tubes on 10/17/23 at [age] years of age. An adenoidectomy was also performed  on 10/17/23. Chester has had no other surgical procedures or hospital admissions.    Chester has a history of vitiligo for which he has been treated in the past with topical pimecrolimus and hydrocortisone without benefit. He was subsequently treated with excimer laser in the Dermatology Clinic at [facility] between 2/5/24 and 5/3/24.    MEDICATIONS:   Current Outpatient Medications   Medication Sig Dispense Refill    albuterol 108 (90 Base) MCG/ACT inhaler Inhale 2 puffs into the lungs every 4 hours as needed for Wheezing. 1 each 0    Probiotic Product (PROBIOTIC DAILY PO) Take 1 tablet by mouth daily.      Multiple Vitamin (MULTIVITAMIN PO) Take 1 tablet by mouth daily. chewable       No current facility-administered medications for this visit.     ALLERGIES:  Cat dander and Dog dander    FAMILY HISTORY: Chester's family history is positive for suspected motor tic manifested by arm waving movements in a paternal uncle. Mother also exhibited repetitive eyelid closure during today's consultation regarding Chester..    SOCIAL HISTORY: Chester lives with his parents and younger sister in [place]. 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 He is not currently participating in any structured after school activities..    REVIEW OF SYSTEMS: Except as noted above, review of systems was negative for symptoms related to constitutional, behavioral, HEENT, cardiac, pulmonary, GI, , endocrine, musculoskeletal, neurological, and dermatologic systems.    PHYSICAL EXAM:  Well nourished, well developed boy with a height 3' 7.25\" (1.099 m)(54th %tile) and weight 18.8 kg (41 lb 6.4 oz)(54th %tile). Skin showed mild residual hyperpigmentation from excimer laser treatment of the face.but no other rash or neurocutaneous stigmata. Head was atraumatic with no dysmorphic craniofacial features. Funduscopic exam showed normal optic disks and retinal pigmentation. Tympanic membranes  were intact with the exception of mild residual scarring from previous tympanostomy tube placement. Oropharynx was clear. Neck was supple with no thyromegaly. Chest was symmetric and clear to auscultation. Cardiac exam showed normal rate and rhythm. Spine showed no palpable defect or deformity. Extremities were symmetric with no cyanosis, edema or restriction of active range of motion.    NEUROLOGICAL EXAM: Chester was awake and alert with clear speech and appropriate fund of information for age. Attention span and impulse control were limited for age. Mood was neutral. Station and gait were normal based and steady. Intermittent forceful eyelid closure was noted during Chester's exam. Visual fields were grossly full to confrontation. Pupils were 3 mm, equal, round and reactive to light and accommodation. Ocular motility was conjugate and full. Facial motility was symmetric. Hearing was grossly intact. Palate elevated symmetrically. Tongue rested in the midline with no fasciculations or atrophy. Motor exam showed normal muscle bulk, strength and tone throughout. Sensory exam was grossly intact to touch and position. Muscle stretch reflexes were 2+ and symmetric. Coordination testing showed no tremor, dysmetria or ataxia. Response to plantar stimulation was flexor bilaterally..    IMPRESSION:  1) Childhood tic disorder manifested initially by chronic cough in January of 2024 and subsequently by repetitive eyelid blinking.  2) Suspected ADHD.  3) Suspected learning disability.    PLAN:  1) Review natural history, associated co-morbidities, and poorly understood etiology of uncomplicated tics.  2) Reassure family that Ulisess tics will not cause neurological harm or physical injury.  3) Encourage supportive attitude and avoidance of adverse attention to the tics.  4) Discuss risks and benefits of medical treatment if the tics become physically or emotionally uncomfortable or a distraction to Chester or his  classmates.  5) Agree with neuropsychological evaluation.  6) Request progress report in one month.  7) Return for follow up in three months.  8) An electronic copy of today's note will be forwarded to Dr. Shanika Brady MD         Normal for race

## 2025-02-16 ENCOUNTER — INPATIENT (INPATIENT)
Facility: HOSPITAL | Age: 55
LOS: 2 days | Discharge: HOME | DRG: 392 | End: 2025-02-19
Attending: HOSPITALIST | Admitting: STUDENT IN AN ORGANIZED HEALTH CARE EDUCATION/TRAINING PROGRAM
Payer: MEDICARE

## 2025-02-16 VITALS
TEMPERATURE: 99 F | OXYGEN SATURATION: 98 % | HEIGHT: 67 IN | WEIGHT: 259.93 LBS | RESPIRATION RATE: 17 BRPM | DIASTOLIC BLOOD PRESSURE: 99 MMHG | SYSTOLIC BLOOD PRESSURE: 155 MMHG | HEART RATE: 97 BPM

## 2025-02-16 DIAGNOSIS — Z90.81 ACQUIRED ABSENCE OF SPLEEN: Chronic | ICD-10-CM

## 2025-02-16 DIAGNOSIS — Z90.49 ACQUIRED ABSENCE OF OTHER SPECIFIED PARTS OF DIGESTIVE TRACT: Chronic | ICD-10-CM

## 2025-02-16 LAB
ALBUMIN SERPL ELPH-MCNC: 4.6 G/DL — SIGNIFICANT CHANGE UP (ref 3.3–5)
ALP SERPL-CCNC: 62 U/L — SIGNIFICANT CHANGE UP (ref 40–120)
ALT FLD-CCNC: 18 U/L — SIGNIFICANT CHANGE UP (ref 10–45)
ANION GAP SERPL CALC-SCNC: 12 MMOL/L — SIGNIFICANT CHANGE UP (ref 5–17)
AST SERPL-CCNC: 15 U/L — SIGNIFICANT CHANGE UP (ref 10–40)
BASOPHILS # BLD AUTO: 0.04 K/UL — SIGNIFICANT CHANGE UP (ref 0–0.2)
BASOPHILS NFR BLD AUTO: 0.2 % — SIGNIFICANT CHANGE UP (ref 0–2)
BILIRUB SERPL-MCNC: 0.4 MG/DL — SIGNIFICANT CHANGE UP (ref 0.2–1.2)
BUN SERPL-MCNC: 16 MG/DL — SIGNIFICANT CHANGE UP (ref 7–23)
CALCIUM SERPL-MCNC: 9.6 MG/DL — SIGNIFICANT CHANGE UP (ref 8.4–10.5)
CHLORIDE SERPL-SCNC: 105 MMOL/L — SIGNIFICANT CHANGE UP (ref 96–108)
CO2 SERPL-SCNC: 20 MMOL/L — LOW (ref 22–31)
CREAT SERPL-MCNC: 0.95 MG/DL — SIGNIFICANT CHANGE UP (ref 0.5–1.3)
EGFR: 70 ML/MIN/1.73M2 — SIGNIFICANT CHANGE UP
EOSINOPHIL # BLD AUTO: 0 K/UL — SIGNIFICANT CHANGE UP (ref 0–0.5)
EOSINOPHIL NFR BLD AUTO: 0 % — SIGNIFICANT CHANGE UP (ref 0–6)
GLUCOSE SERPL-MCNC: 137 MG/DL — HIGH (ref 70–99)
HCT VFR BLD CALC: 44.3 % — SIGNIFICANT CHANGE UP (ref 34.5–45)
HGB BLD-MCNC: 14.8 G/DL — SIGNIFICANT CHANGE UP (ref 11.5–15.5)
IMM GRANULOCYTES NFR BLD AUTO: 0.5 % — SIGNIFICANT CHANGE UP (ref 0–0.9)
LACTATE SERPL-SCNC: 1.7 MMOL/L — SIGNIFICANT CHANGE UP (ref 0.5–2)
LIDOCAIN IGE QN: 105 U/L — HIGH (ref 7–60)
LYMPHOCYTES # BLD AUTO: 13.2 % — SIGNIFICANT CHANGE UP (ref 13–44)
LYMPHOCYTES # BLD AUTO: 2.78 K/UL — SIGNIFICANT CHANGE UP (ref 1–3.3)
MCHC RBC-ENTMCNC: 30.4 PG — SIGNIFICANT CHANGE UP (ref 27–34)
MCHC RBC-ENTMCNC: 33.4 G/DL — SIGNIFICANT CHANGE UP (ref 32–36)
MCV RBC AUTO: 91 FL — SIGNIFICANT CHANGE UP (ref 80–100)
MONOCYTES # BLD AUTO: 1.09 K/UL — HIGH (ref 0–0.9)
MONOCYTES NFR BLD AUTO: 5.2 % — SIGNIFICANT CHANGE UP (ref 2–14)
NEUTROPHILS # BLD AUTO: 17.12 K/UL — HIGH (ref 1.8–7.4)
NEUTROPHILS NFR BLD AUTO: 80.9 % — HIGH (ref 43–77)
NRBC BLD AUTO-RTO: 0 /100 WBCS — SIGNIFICANT CHANGE UP (ref 0–0)
PLATELET # BLD AUTO: 461 K/UL — HIGH (ref 150–400)
POTASSIUM SERPL-MCNC: 3.6 MMOL/L — SIGNIFICANT CHANGE UP (ref 3.5–5.3)
POTASSIUM SERPL-SCNC: 3.6 MMOL/L — SIGNIFICANT CHANGE UP (ref 3.5–5.3)
PROT SERPL-MCNC: 8.2 G/DL — SIGNIFICANT CHANGE UP (ref 6–8.3)
RBC # BLD: 4.87 M/UL — SIGNIFICANT CHANGE UP (ref 3.8–5.2)
RBC # FLD: 14.2 % — SIGNIFICANT CHANGE UP (ref 10.3–14.5)
SODIUM SERPL-SCNC: 137 MMOL/L — SIGNIFICANT CHANGE UP (ref 135–145)
WBC # BLD: 21.14 K/UL — HIGH (ref 3.8–10.5)
WBC # FLD AUTO: 21.14 K/UL — HIGH (ref 3.8–10.5)

## 2025-02-16 PROCEDURE — 93010 ELECTROCARDIOGRAM REPORT: CPT

## 2025-02-16 PROCEDURE — 99285 EMERGENCY DEPT VISIT HI MDM: CPT

## 2025-02-16 RX ORDER — HALOPERIDOL 10 MG/1
5 TABLET ORAL ONCE
Refills: 0 | Status: COMPLETED | OUTPATIENT
Start: 2025-02-16 | End: 2025-02-16

## 2025-02-16 RX ADMIN — Medication 1000 MILLILITER(S): at 19:35

## 2025-02-16 RX ADMIN — HALOPERIDOL 5 MILLIGRAM(S): 10 TABLET ORAL at 19:36

## 2025-02-16 RX ADMIN — Medication 1000 MILLILITER(S): at 17:38

## 2025-02-16 NOTE — ED ADULT NURSE NOTE - OBJECTIVE STATEMENT
Pt is a 53yo female PMHx CVS presenting to ED c/o vomiting. Pt states, "I have vomited 20x in the last 2 days, the last episode was in the ambulance. My vomit was food at first but now just yellow, and I got zofran at urgent care but that did not help at all. Now I am still nauseas and suffering from abdominal cramps." Pt A&Ox4, breathing even and unlabored speaking in clear full sentences, ambulatory with steady gait, denies lightheadedness, dizziness, h/a, c/p, sob, f/c, vital signs stable.

## 2025-02-16 NOTE — ED ADULT TRIAGE NOTE - CHIEF COMPLAINT QUOTE
Pt c/o vomiting x2 days. Seen at outside UC and given 4mg zofran ODT PTA. Hx cyclical vomiting syndrome. Denies f/c, CP/SOB, constipation/diarrhea.

## 2025-02-16 NOTE — ED ADULT NURSE REASSESSMENT NOTE - NS ED NURSE REASSESS COMMENT FT1
Received patient report from ALFREDA Lee. Patient in stretcher, AOX4. Vital signs as noted in flowsheet. Patient came in due to vomiting, denies fever, . Patient oriented to ED area. Plan of care discussed and verbalized understanding. All needs attended. Purposeful proactive hourly rounding in progress.

## 2025-02-16 NOTE — ED PROVIDER NOTE - OBJECTIVE STATEMENT
55 yo F PMH CVS, leukocytosis presents with abd pain and vomiting.  Went to UC and was given zofran without relief.   Similar symptoms in the past.  States she stopped cannabis but still gets these episodes.  Usually has relief with Haldol.  No fever, cough, diarrhea.  Mild diffuse abd pain.l

## 2025-02-16 NOTE — ED PROVIDER NOTE - PHYSICAL EXAMINATION
General:  Appears tired, but not acutely ill  HEENT:  No conjunctival injection, neck supple, no congestion, mM dry    Chest:  Non-tender, no crepitance  Lungs:  Clear to auscultation bilaterally   Heart:  s1s2 normal, no murmur  Abdomen:  soft, very mild diffuse tenderness  :  Deferred  Rectal:  Deferred  Extremities: No edema, normal perfusion, no joint swelling or tenderness  Neuro:  Alert, conversant, motor/sensory grossly intact   Psychiatry:  Calm, cooperative, no expression of suicidal or homicidal ideation

## 2025-02-16 NOTE — ED PROVIDER NOTE - CLINICAL SUMMARY MEDICAL DECISION MAKING FREE TEXT BOX
53 yo F PMH CVS, bipolar presents with exacerbation of vomiting syndrome.  Will give trial of haldol, fluids.      Patient had temporary improvement with haldol, mostly was able to rest.  Vomiting resumed, although pain was diminished.  We discussed plan and decided to admit.  Labs reviewed and compared to prior-leukocytosis a known issue for this patient. and c/w prior episodes.

## 2025-02-17 DIAGNOSIS — R11.2 NAUSEA WITH VOMITING, UNSPECIFIED: ICD-10-CM

## 2025-02-17 DIAGNOSIS — Z29.9 ENCOUNTER FOR PROPHYLACTIC MEASURES, UNSPECIFIED: ICD-10-CM

## 2025-02-17 DIAGNOSIS — F31.9 BIPOLAR DISORDER, UNSPECIFIED: ICD-10-CM

## 2025-02-17 LAB
ANION GAP SERPL CALC-SCNC: 11 MMOL/L — SIGNIFICANT CHANGE UP (ref 5–17)
BASOPHILS # BLD AUTO: 0 K/UL — SIGNIFICANT CHANGE UP (ref 0–0.2)
BASOPHILS NFR BLD AUTO: 0 % — SIGNIFICANT CHANGE UP (ref 0–2)
BUN SERPL-MCNC: 15 MG/DL — SIGNIFICANT CHANGE UP (ref 7–23)
CALCIUM SERPL-MCNC: 9 MG/DL — SIGNIFICANT CHANGE UP (ref 8.4–10.5)
CHLORIDE SERPL-SCNC: 107 MMOL/L — SIGNIFICANT CHANGE UP (ref 96–108)
CO2 SERPL-SCNC: 20 MMOL/L — LOW (ref 22–31)
CREAT SERPL-MCNC: 0.98 MG/DL — SIGNIFICANT CHANGE UP (ref 0.5–1.3)
EGFR: 68 ML/MIN/1.73M2 — SIGNIFICANT CHANGE UP
EOSINOPHIL # BLD AUTO: 0.32 K/UL — SIGNIFICANT CHANGE UP (ref 0–0.5)
EOSINOPHIL NFR BLD AUTO: 1.5 % — SIGNIFICANT CHANGE UP (ref 0–6)
GI PCR PANEL: SIGNIFICANT CHANGE UP
GLUCOSE SERPL-MCNC: 100 MG/DL — HIGH (ref 70–99)
HCT VFR BLD CALC: 43.6 % — SIGNIFICANT CHANGE UP (ref 34.5–45)
HGB BLD-MCNC: 13.8 G/DL — SIGNIFICANT CHANGE UP (ref 11.5–15.5)
LYMPHOCYTES # BLD AUTO: 29.3 % — SIGNIFICANT CHANGE UP (ref 13–44)
LYMPHOCYTES # BLD AUTO: 6.23 K/UL — HIGH (ref 1–3.3)
MAGNESIUM SERPL-MCNC: 2.2 MG/DL — SIGNIFICANT CHANGE UP (ref 1.6–2.6)
MCHC RBC-ENTMCNC: 28.8 PG — SIGNIFICANT CHANGE UP (ref 27–34)
MCHC RBC-ENTMCNC: 31.7 G/DL — LOW (ref 32–36)
MCV RBC AUTO: 90.8 FL — SIGNIFICANT CHANGE UP (ref 80–100)
MONOCYTES # BLD AUTO: 1.76 K/UL — HIGH (ref 0–0.9)
MONOCYTES NFR BLD AUTO: 8.3 % — SIGNIFICANT CHANGE UP (ref 2–14)
NEUTROPHILS # BLD AUTO: 12.95 K/UL — HIGH (ref 1.8–7.4)
NEUTROPHILS NFR BLD AUTO: 60.9 % — SIGNIFICANT CHANGE UP (ref 43–77)
PCP SPEC-MCNC: SIGNIFICANT CHANGE UP
PHOSPHATE SERPL-MCNC: 2.9 MG/DL — SIGNIFICANT CHANGE UP (ref 2.5–4.5)
PLATELET # BLD AUTO: 424 K/UL — HIGH (ref 150–400)
POTASSIUM SERPL-MCNC: 3.4 MMOL/L — LOW (ref 3.5–5.3)
POTASSIUM SERPL-SCNC: 3.4 MMOL/L — LOW (ref 3.5–5.3)
RBC # BLD: 4.8 M/UL — SIGNIFICANT CHANGE UP (ref 3.8–5.2)
RBC # FLD: 14.4 % — SIGNIFICANT CHANGE UP (ref 10.3–14.5)
SODIUM SERPL-SCNC: 138 MMOL/L — SIGNIFICANT CHANGE UP (ref 135–145)
WBC # BLD: 21.26 K/UL — HIGH (ref 3.8–10.5)
WBC # FLD AUTO: 21.26 K/UL — HIGH (ref 3.8–10.5)

## 2025-02-17 PROCEDURE — 99223 1ST HOSP IP/OBS HIGH 75: CPT | Mod: GC

## 2025-02-17 RX ORDER — CLONAZEPAM 0.5 MG/1
0.5 TABLET ORAL
Refills: 0 | Status: DISCONTINUED | OUTPATIENT
Start: 2025-02-17 | End: 2025-02-19

## 2025-02-17 RX ORDER — ACETAMINOPHEN 500 MG/5ML
650 LIQUID (ML) ORAL EVERY 6 HOURS
Refills: 0 | Status: DISCONTINUED | OUTPATIENT
Start: 2025-02-17 | End: 2025-02-19

## 2025-02-17 RX ORDER — LURASIDONE HYDROCHLORIDE 120 MG/1
40 TABLET, FILM COATED ORAL AT BEDTIME
Refills: 0 | Status: DISCONTINUED | OUTPATIENT
Start: 2025-02-17 | End: 2025-02-19

## 2025-02-17 RX ORDER — MELATONIN 5 MG
3 TABLET ORAL AT BEDTIME
Refills: 0 | Status: DISCONTINUED | OUTPATIENT
Start: 2025-02-17 | End: 2025-02-19

## 2025-02-17 RX ORDER — ARIPIPRAZOLE 2 MG/1
5 TABLET ORAL DAILY
Refills: 0 | Status: DISCONTINUED | OUTPATIENT
Start: 2025-02-17 | End: 2025-02-17

## 2025-02-17 RX ORDER — ARIPIPRAZOLE 2 MG/1
5 TABLET ORAL EVERY 24 HOURS
Refills: 0 | Status: DISCONTINUED | OUTPATIENT
Start: 2025-02-17 | End: 2025-02-19

## 2025-02-17 RX ORDER — SERTRALINE 100 MG/1
75 TABLET, FILM COATED ORAL EVERY 24 HOURS
Refills: 0 | Status: DISCONTINUED | OUTPATIENT
Start: 2025-02-17 | End: 2025-02-19

## 2025-02-17 RX ORDER — HALOPERIDOL 10 MG/1
0.5 TABLET ORAL EVERY 6 HOURS
Refills: 0 | Status: COMPLETED | OUTPATIENT
Start: 2025-02-17 | End: 2025-02-17

## 2025-02-17 RX ORDER — SCOPOLAMINE 1 MG/3D
1 PATCH, EXTENDED RELEASE TRANSDERMAL ONCE
Refills: 0 | Status: COMPLETED | OUTPATIENT
Start: 2025-02-17 | End: 2025-02-17

## 2025-02-17 RX ORDER — CYCLOBENZAPRINE HYDROCHLORIDE 15 MG/1
5 CAPSULE, EXTENDED RELEASE ORAL THREE TIMES A DAY
Refills: 0 | Status: DISCONTINUED | OUTPATIENT
Start: 2025-02-17 | End: 2025-02-19

## 2025-02-17 RX ORDER — CLONAZEPAM 0.5 MG/1
0.5 TABLET ORAL DAILY
Refills: 0 | Status: DISCONTINUED | OUTPATIENT
Start: 2025-02-17 | End: 2025-02-19

## 2025-02-17 RX ORDER — TOPIRAMATE 25 MG/1
25 TABLET, FILM COATED ORAL AT BEDTIME
Refills: 0 | Status: DISCONTINUED | OUTPATIENT
Start: 2025-02-17 | End: 2025-02-19

## 2025-02-17 RX ORDER — BUPROPION HYDROBROMIDE 522 MG/1
200 TABLET, EXTENDED RELEASE ORAL DAILY
Refills: 0 | Status: DISCONTINUED | OUTPATIENT
Start: 2025-02-17 | End: 2025-02-17

## 2025-02-17 RX ORDER — ONDANSETRON HCL/PF 4 MG/2 ML
4 VIAL (ML) INJECTION EVERY 8 HOURS
Refills: 0 | Status: DISCONTINUED | OUTPATIENT
Start: 2025-02-17 | End: 2025-02-19

## 2025-02-17 RX ORDER — SERTRALINE 100 MG/1
75 TABLET, FILM COATED ORAL DAILY
Refills: 0 | Status: DISCONTINUED | OUTPATIENT
Start: 2025-02-17 | End: 2025-02-17

## 2025-02-17 RX ORDER — LORAZEPAM 4 MG/ML
1 VIAL (ML) INJECTION ONCE
Refills: 0 | Status: DISCONTINUED | OUTPATIENT
Start: 2025-02-17 | End: 2025-02-17

## 2025-02-17 RX ORDER — ENOXAPARIN SODIUM 100 MG/ML
40 INJECTION SUBCUTANEOUS EVERY 12 HOURS
Refills: 0 | Status: DISCONTINUED | OUTPATIENT
Start: 2025-02-17 | End: 2025-02-19

## 2025-02-17 RX ORDER — LURASIDONE HYDROCHLORIDE 120 MG/1
40 TABLET, FILM COATED ORAL DAILY
Refills: 0 | Status: DISCONTINUED | OUTPATIENT
Start: 2025-02-17 | End: 2025-02-17

## 2025-02-17 RX ORDER — CLONAZEPAM 0.5 MG/1
0.5 TABLET ORAL EVERY 12 HOURS
Refills: 0 | Status: DISCONTINUED | OUTPATIENT
Start: 2025-02-17 | End: 2025-02-17

## 2025-02-17 RX ORDER — MAGNESIUM, ALUMINUM HYDROXIDE 200-200 MG
30 TABLET,CHEWABLE ORAL EVERY 4 HOURS
Refills: 0 | Status: DISCONTINUED | OUTPATIENT
Start: 2025-02-17 | End: 2025-02-19

## 2025-02-17 RX ADMIN — HALOPERIDOL 0.5 MILLIGRAM(S): 10 TABLET ORAL at 23:27

## 2025-02-17 RX ADMIN — HALOPERIDOL 0.5 MILLIGRAM(S): 10 TABLET ORAL at 13:13

## 2025-02-17 RX ADMIN — Medication 4 MILLIGRAM(S): at 18:37

## 2025-02-17 RX ADMIN — Medication 650 MILLIGRAM(S): at 08:51

## 2025-02-17 RX ADMIN — Medication 650 MILLIGRAM(S): at 07:51

## 2025-02-17 RX ADMIN — ENOXAPARIN SODIUM 40 MILLIGRAM(S): 100 INJECTION SUBCUTANEOUS at 23:27

## 2025-02-17 RX ADMIN — ARIPIPRAZOLE 5 MILLIGRAM(S): 2 TABLET ORAL at 11:14

## 2025-02-17 RX ADMIN — Medication 40 MILLIEQUIVALENT(S): at 11:14

## 2025-02-17 RX ADMIN — SERTRALINE 75 MILLIGRAM(S): 100 TABLET, FILM COATED ORAL at 11:14

## 2025-02-17 RX ADMIN — Medication 1 MILLIGRAM(S): at 14:20

## 2025-02-17 RX ADMIN — CLONAZEPAM 0.5 MILLIGRAM(S): 0.5 TABLET ORAL at 11:14

## 2025-02-17 NOTE — DISCHARGE NOTE PROVIDER - ATTENDING DISCHARGE PHYSICAL EXAMINATION:
******************************************************  ATTENDING ATTESTATION    I have read and agree with the resident Discharge Note above. Patient seen and discussed with resident team on the day of discharge.     Briefly, 52F with history of bipolar disorder, IBS, and cannabinoid hyperemesis syndrome with many prior admissions, currently with active THC use, who presented with n/v likely 2/2 recurrent CHS. Initially required IV Ativan and compazine for symptomatic management, improved with time and patient was able to tolerate PO on day of discharge. Electrolytes were repleted. Discussed with patient importance of abstinence from THC, which she expresses understanding for. Will continue to follow up with Psych outpatient. Leukocytosis is likely reactive and now downtrending, otherwise no infectious s/s .       Physical Exam:  T(C): 36.9  HR: 62  BP: 164/88  RR: 18  SpO2: 95%    Gen: sitting upright in bed at time of exam, INAD, well-appearing  HEENT: NCAT, MMM  Pulm: adequate respiratory effort, no increased work of breathing  Abd: soft, NTND  Neuro: AOx3, no gross focal neurological deficits  Psych: affect and behavior appropriate    I was physically present for the evaluation and management services provided. I agree with the included history, physical, and plan which I reviewed and edited where appropriate. I spent 33 minutes on direct patient care and discharge planning with more than 50% of the visit spent on counseling and/or coordination of care.

## 2025-02-17 NOTE — PATIENT PROFILE ADULT - NSPROPTRIGHTREPPHONE_GEN_A_NUR
----- Message from Vesna Booker MD sent at 3/24/2017  8:26 AM CDT -----  PSA is normal and better than last year. Blood sugar is better. Lipids are a little worse, but he has not been taking his statin daily.  Encourage him to do so.  Alk phos is a little high.  This could be due to a low vitamin D level.  Please have him take 2000 units daily.  CMP and FLP in 6 months.  
Message left for Pt to call back  Lab orders placed  
Spoke with Pt regarding lab results  Advised per Dr. Booker: PSA is normal and better than last year. Blood sugar is better. Lipids are a little worse, need to take statin daily; Alk phos is a little high. This could be due to a low vitamin D level,  take 2000 units daily. CMP and FLP in 6 months.  Stated understanding  
[phone]

## 2025-02-17 NOTE — H&P ADULT - ATTENDING COMMENTS
Patient was seen and examined at bedside on 2/17/2025 at 10 am. Patient reports improved nausea, vomiting. Was able to tolerate breakfast. Reports episode of diarrhea. Denies SOB, CP. ROS is otherwise negative. Vitals, labwork and pertinent imaging reviewed. Exam - NAD, AAO x 4, PERRLA, EOMI, MMM, supple neck, chest - CTA b/l, CV - rrr, s1s2, no m/r/g, no JVD, abd - soft, NTND, + BS, back - midline, ext - wwp, psych - normal affect, skin - no rash    Plan:  -C/w supportive care  -GI PCR  -Anticipate possible d/c today

## 2025-02-17 NOTE — PATIENT PROFILE ADULT - NS PRO AD NO ADVANCE DIRECTIVE
GENERAL SURGERY PROGRESS NOTE     CC: Abdominal wall abscess, resolved; Hospital day #14    INTERVAL HISTORY: Patient is awake and did not appear to be in any distress. Patient is nonverbal but responds to yes and no questions by nodding her head and is able to point to parts of her body. No status changes overnight. Patient tolerating tube feedings. Denies nausea and vomiting.     REVIEW OF SYSTEMS    CONSTITUTIONAL: Denies - fever and chills.   CARDIOVASCuLAR: Denies - chest pain, palpitations and edema.   RESPIRATORY: Denies - shortness of breath and cough.  GASTROINTESTINAL: Positive for mild abdominal pain  NEUROLOGICAL: Positive for headache    Pertinent past history  Past Medical History:   Diagnosis Date   • Anxiety    • Blood clot associated with vein wall inflammation    • Cerebral infarction (CMS/HCC)    • Congestive cardiac failure (CMS/HCC)    • Coronary artery disease    • Depression    • Diabetes mellitus (CMS/HCC)     Type II   • Essential (primary) hypertension    • High cholesterol    • Myocardial infarction (CMS/HCC)    • Uncomplicated senile dementia (CMS/HCC)    • Urinary incontinence      Past Surgical History:   Procedure Laterality Date   • Abdomen surgery     • Brain surgery      Crainiotomy   • Cardiac catherization      Double bypass   • Cardiac surgery     •  section, classic           PHYSICAL EXAM:   Constitutional:   Visit Vitals  /56 (BP Location: LUE - Left upper extremity, Patient Position: Semi-Ruelas's)   Pulse 97   Temp 98.4 °F (36.9 °C) (Oral)   Resp 16   Ht 5' 2\" (1.575 m)   Wt 69.3 kg   SpO2 97%   BMI 27.94 kg/m²   General -appears comforable -obese body habitus, patient is afebrile and non-septic  Resp: -Normal effort -normal breath sounds bilaterally  CV: -Regular rate and rhythm -no lower extremity edema  GI: Soft, positive for mild tenderness in left upper quadrant over the area that had the abscess (now resolved), non-distended, G-J tube in place, G tube  minimal output ~50 ml, clear output, abscess drain was removed yesterday and incision healing well and redressed with clean gauze, G-tube output 250 ml over last 24 hours  Extremity: no edema in bilateral lower extremities  Psych: -Oriented to person, place, and time -Normal mood and affect    Laboratory Results:  WBC (K/mcL)   Date Value   12/01/2020 7.1     HGB (g/dL)   Date Value   12/01/2020 8.9 (L)     PLT (K/mcL)   Date Value   12/01/2020 169     Creatinine (mg/dL)   Date Value   11/30/2020 0.47 (L)     INR (sec)   Date Value   11/17/2020 1.6       Imaging   CT CHEST ABDOMEN PELVIS WO CONTRAST - 11/27/20   IMPRESSION  1.  The abscess in the left anterior abdominal wall has resolved  2.  Placement of gastrojejunostomy tube. No bowel distention  3.  Areas of infiltration of the subcutaneous fat in the anterior abdominal  wall. These are probably related to injections however cellulitis cannot be  excluded. There is also prominent area of density in the subcutaneous fat  in the left lower lateral abdominal wall possibly representing cellulitis    ASSESSMENT:  Patient is a 56 y/o female who presented with an abdominal wall abscess which is now resolved as confirmed by CT of abdomen pelvis .No signs of infection. Tolerating tube feedings without reflux into the stomach.  Patient appears stable and doing well.     Normocytic anemia - patient's HGB and HCT decreased but around her baseline since her admission. Likely anemia of chronic disease.    PLAN:  · Continue tube feedings with J tube  · Keep G tube drainage to gravity and monitor/report output  · Abx per infectious disease  · Continue trending CBC   · Consider clamping G tube in the next 3-5 days if patient remains non-septic and consider follow up with CT of abdomen and pelvis      Faith LeonardAl[facility]. [facility]  Medical Student  General Vascular Surgery    I interviewed and examined the pt with the 3rd year medical student.  I agree with the above. Plan  communicated to the hospitalist.   No

## 2025-02-17 NOTE — H&P ADULT - PROBLEM SELECTOR PLAN 1
pt previously diagnosed w cannabinoid hyperemesis syndrome  reports prior daily cannabis use, some period of abstinence w recurrence of cyclic vomiting    - follow up utox, if negative consider further workup of cyclic vomiting (consider tx w combined co-enzyme q10, l-carnitine, amitriptyline)  - sp haldol 5mg in ed, will continue 0.5q6h prn for persistent sx (may increase up to 2mg q6h) for today

## 2025-02-17 NOTE — H&P ADULT - PROBLEM SELECTOR PLAN 2
home medication(s): buproprion 200mg qd, klonopin 0.5mg bid, abilify 5mg qd, latuda 40mg qd, sertraline 75mg qd, topomax 25mg qhs  qtc on admission 433    - continue home medication(s)  - monitor qt interval pt previously diagnosed w cannabinoid hyperemesis syndrome  reports prior daily cannabis use, some period of abstinence w recurrence of cyclic vomiting    - follow up utox, if negative consider further workup of cyclic vomiting (consider tx w combined co-enzyme q10, l-carnitine, amitriptyline)  - sp haldol 5mg in ed, will continue 0.5q6h prn for persistent sx (may increase up to 2mg q6h) for today

## 2025-02-17 NOTE — PATIENT PROFILE ADULT - FUNCTIONAL ASSESSMENT - BASIC MOBILITY 6.
4 = No assist / stand by assistance 4-calculated by average/Not able to assess (calculate score using [facility] averaging method)

## 2025-02-17 NOTE — H&P ADULT - PROBLEM SELECTOR PLAN 3
F: none  E: replete as needed  N: regular diet   VTE ppx: lovenox sq  GI ppx: pantoprazole 40  Dispo: F  Code status: full home medication(s): buproprion 200mg qd, klonopin 0.5mg bid, abilify 5mg qd, latuda 40mg qd, sertraline 75mg qd, topomax 25mg qhs  qtc on admission 433    - continue home medication(s)  - monitor qt interval

## 2025-02-17 NOTE — DISCHARGE NOTE PROVIDER - HOSPITAL COURSE
#Discharge: do not delete    Patient is 52F PMH with bipolar disorder, IBS, and cannabinoid hyperemesis syndrome (reports 13 hospitalizations in 2022 for [facility], most recent admission in july 2024), who presents with nausea and vomiting she describes as similar to prior episodes of repeated vomiting.      Hospital course (by problem):   1. Nausea & vomiting.   Pt previously diagnosed w cannabinoid hyperemesis syndrome  reports prior daily cannabis use, some period of abstinence w recurrence of cyclic vomiting  UTox on admission +ve for Benzo + THC, likely recurrence of cannabinoid hyperemesis. (Reports used THC on     - sp haldol 5mg in ed  - continue 0.5q6h prn for persistent sx (may increase up to 2mg q6h) for today.    Patient was discharged to: (home/[facility]/acute rehab/hospice, etc, and with what services – home health PT/RN? Home O2?)    New medications:   Changes to old medications:  Medications that were stopped:    Items to follow up as outpatient:    Physical exam at the time of discharge:       #Discharge: do not delete    Patient is 52F PMH with bipolar disorder, IBS, and cannabinoid hyperemesis syndrome (reports 13 hospitalizations in 2022 for [facility], most recent admission in july 2024), who presents with nausea and vomiting she describes as similar to prior episodes of repeated vomiting.      Hospital course (by problem):   1. Nausea & vomiting.   Pt previously diagnosed w cannabinoid hyperemesis syndrome  reports prior daily cannabis use, some period of abstinence w recurrence of cyclic vomiting  UTox on admission +ve for Benzo + THC, likely recurrence of cannabinoid hyperemesis. (Reports used THC on   sp haldol 5mg in ED with [id number] relief. She stated that Zofran usually doesn't work  S/p Compazine 2.5mg IVP q4h, patient reports best relief from nausea with Compazine  - Upon discharge, continue [__________]    #Bipolar disorder.   ·  Plan: home medication(s): buproprion 200mg SR BID, klonopin 0.5mg 2 pills standing, 1 pill PRN, abilify 5mg qd, latuda 40mg qhs, sertraline 75mg qd, topomax 25mg qhs  qtc on admission 433  - continue home medication(s)      - [Confirm formal med rec as patient reportedly on multiple antipsychotics]      Patient was discharged to: Home    New medications:   Changes to old medications:  Medications that were stopped:    Items to follow up as outpatient:    Physical exam at the time of discharge:       #Discharge: do not delete    Patient is 52F PMH with bipolar disorder, IBS, and cannabinoid hyperemesis syndrome (reports 13 hospitalizations in 2022 for [facility], most recent admission in july 2024), who presents with nausea and vomiting she describes as similar to prior episodes of repeated vomiting.      Hospital course (by problem):   1. Nausea & vomiting.   Pt previously diagnosed w cannabinoid hyperemesis syndrome  reports prior daily cannabis use, some period of abstinence w recurrence of cyclic vomiting  UTox on admission +ve for Benzo + THC, likely recurrence of cannabinoid hyperemesis. (Reports used THC on   sp haldol 5mg in ED with [id number] relief. She stated that Zofran usually doesn't work  S/p Compazine 2.5mg IVP q4h, patient reports best relief from nausea with Compazine  - Upon discharge, continue continue zofran 4mg q6 prn for nausea-3 day course given    #Bipolar disorder.   ·  Plan: home medication(s): buproprion 200mg SR BID, klonopin 0.5mg 2 pills standing, 1 pill PRN, abilify 5mg qd, latuda 40mg qhs, sertraline 75mg qd, topomax 25mg qhs  qtc on admission 433  - continue home medication(s)    Patient was discharged to: Home    New medications: zofran  Changes to old medications: none  Medications that were stopped: none    Items to follow up as outpatient:  -f/u PCP    Physical exam at the time of discharge:  Constitutional - NAD, Comfortable  HEENT - NCAT, EOMI  Neck - No limited ROM  Chest - Breathing comfortably on room air, CTAB   Cardio - Warm and well perfused, RRR  Abdomen - Soft, distended but non-tender, +BS  Extremities - No peripheral edema, No calf tenderness   Neurologic - Fully awake and alert. Speaking fluently  Psychiatric - Mood stable, Affect WNL

## 2025-02-17 NOTE — PROGRESS NOTE ADULT - PROBLEM SELECTOR PLAN 2
home medication(s): buproprion 200mg qd, klonopin 0.5mg bid, abilify 5mg qd, latuda 40mg qd, sertraline 75mg qd, topomax 25mg qhs  qtc on admission 433    - continue home medication(s)  - monitor qt interval given multiple QT prolonging meds + haldol home medication(s): buproprion 200mg SR BID, klonopin 0.5mg bid, abilify 5mg qd, latuda 40mg qd, sertraline 75mg qd, topomax 25mg qhs  qtc on admission 433    - continue home medication(s)  - home bupropion is nonformulary (short acting), no equivalent dosing will need to bring in  - monitor qt interval given multiple QT prolonging meds + haldol home medication(s): buproprion 200mg SR BID, klonopin 0.5mg 2 pills standing, 1 pill PRN, abilify 5mg qd, latuda 40mg qhs, sertraline 75mg qd, topomax 25mg qhs  qtc on admission 433    - continue home medication(s)  - home bupropion is nonformulary (short acting), no equivalent dosing, patient informed will need to bring in (pt notes cannot tolerate long acting bupropion as this causes agitation/anxiety for her)  - monitor qt interval given multiple QT prolonging meds + haldol

## 2025-02-17 NOTE — DISCHARGE NOTE PROVIDER - NSDCCPCAREPLAN_GEN_ALL_CORE_FT
PRINCIPAL DISCHARGE DIAGNOSIS  Diagnosis: Cannabinoid hyperemesis syndrome  Assessment and Plan of Treatment: You were admitted to [facility] for persistent vomiting due to cannabis use, which we call cannabinoid hyperemesis syndrome. In the hospital, you received many different anti-nausea medications to try to reduce the severity of your symptoms.  After leaving the hospital, please do the followin)  2)  3)     PRINCIPAL DISCHARGE DIAGNOSIS  Diagnosis: Cannabinoid hyperemesis syndrome  Assessment and Plan of Treatment: You were admitted to [facility] for persistent vomiting due to cannabis use, which we call cannabinoid hyperemesis syndrome. In the hospital, you received many different anti-nausea medications to try to reduce the severity of your symptoms.  After leaving the hospital, please do the following:  Stop using marijuana. The nausea and vomiting will stop soon after, often within a couple of days or weeks.  Take a hot shower or bath.   To prevent dehydration, drink plenty of fluids.

## 2025-02-17 NOTE — PROGRESS NOTE ADULT - PROBLEM SELECTOR PLAN 1
pt previously diagnosed w cannabinoid hyperemesis syndrome  reports prior daily cannabis use, some period of abstinence w recurrence of cyclic vomiting   UTox on admission +ve for Benzo + THC, likely recurrence of cannabinoid hyperemesis.    - sp haldol 5mg in ed  - continue 0.5q6h prn for persistent sx (may increase up to 2mg q6h) for today

## 2025-02-17 NOTE — PROGRESS NOTE ADULT - SUBJECTIVE AND OBJECTIVE BOX
Patient is a 54y old  Female who presents with a chief complaint of nausea & vomiting (17 Feb 2025 00:44)      INTERVAL HPI/OVERNIGHT EVENTS:   admitted. UTox cannabis positive.    SUBJECTIVE:      Vital Signs Last 24 Hrs  T(C): 36.4 (17 Feb 2025 05:34), Max: 37.4 (16 Feb 2025 17:11)  T(F): 97.6 (17 Feb 2025 05:34), Max: 99.3 (16 Feb 2025 17:11)  HR: 80 (17 Feb 2025 05:34) (76 - 97)  BP: 99/65 (17 Feb 2025 05:34) (99/65 - 155/99)  BP(mean): --  ABP: --  ABP(mean): --  RR: 17 (17 Feb 2025 05:34) (17 - 20)  SpO2: 93% (17 Feb 2025 05:34) (93% - 98%)    O2 Parameters below as of 17 Feb 2025 05:34  Patient On (Oxygen Delivery Method): room air          I&O's Summary    16 Feb 2025 07:01  -  17 Feb 2025 07:00  --------------------------------------------------------  IN: 0 mL / OUT: 150 mL / NET: -150 mL          LABS:                        14.8   21.14 )-----------( 461      ( 16 Feb 2025 17:43 )             44.3     02-16    137  |  105  |  16  ----------------------------<  137[H]  3.6   |  20[L]  |  0.95    Ca    9.6      16 Feb 2025 17:43    TPro  8.2  /  Alb  4.6  /  TBili  0.4  /  DBili  x   /  AST  15  /  ALT  18  /  AlkPhos  62  02-16      Urinalysis Basic - ( 16 Feb 2025 17:43 )    Color: x / Appearance: x / SG: x / pH: x  Gluc: 137 mg/dL / Ketone: x  / Bili: x / Urobili: x   Blood: x / Protein: x / Nitrite: x   Leuk Esterase: x / RBC: x / WBC x   Sq Epi: x / Non Sq Epi: x / Bacteria: x      CAPILLARY BLOOD GLUCOSE      POCT Blood Glucose.: 139 mg/dL (16 Feb 2025 17:13)        RADIOLOGY & ADDITIONAL TESTS:    Consultant(s) Notes Reviewed:  [x ] YES  [ ] NO    MEDICATIONS  (STANDING):  ARIPiprazole 5 milliGRAM(s) Oral daily  buPROPion XL (24-Hour) . 200 milliGRAM(s) Oral daily  lurasidone 40 milliGRAM(s) Oral daily  sertraline 75 milliGRAM(s) Oral daily  topiramate 25 milliGRAM(s) Oral at bedtime    MEDICATIONS  (PRN):  acetaminophen     Tablet .. 650 milliGRAM(s) Oral every 6 hours PRN Temp greater or equal to 38C (100.4F), Mild Pain (1 - 3)  aluminum hydroxide/magnesium hydroxide/simethicone Suspension 30 milliLiter(s) Oral every 4 hours PRN Dyspepsia  clonazePAM  Tablet 0.5 milliGRAM(s) Oral every 12 hours PRN for anxiety  haloperidol    Injectable 0.5 milliGRAM(s) IntraMuscular every 6 hours PRN nausea & vomiting  melatonin 3 milliGRAM(s) Oral at bedtime PRN Insomnia  ondansetron Injectable 4 milliGRAM(s) IV Push every 8 hours PRN Nausea and/or Vomiting      Care Discussed with Consultants/Other Providers [ x] YES  [ ] NO Patient is a 54y old  Female who presents with a chief complaint of nausea & vomiting (17 Feb 2025 00:44)      INTERVAL HPI/OVERNIGHT EVENTS:   admitted. UTox cannabis positive.    SUBJECTIVE:  Patient seen and examined at bedside, reports pain with coughing which she suspects is 2/2 recent extensive nausea/vomiting.      Vital Signs Last 24 Hrs  T(C): 36.4 (17 Feb 2025 05:34), Max: 37.4 (16 Feb 2025 17:11)  T(F): 97.6 (17 Feb 2025 05:34), Max: 99.3 (16 Feb 2025 17:11)  HR: 80 (17 Feb 2025 05:34) (76 - 97)  BP: 99/65 (17 Feb 2025 05:34) (99/65 - 155/99)  BP(mean): --  ABP: --  ABP(mean): --  RR: 17 (17 Feb 2025 05:34) (17 - 20)  SpO2: 93% (17 Feb 2025 05:34) (93% - 98%)    O2 Parameters below as of 17 Feb 2025 05:34  Patient On (Oxygen Delivery Method): room air      I&O's Summary    16 Feb 2025 07:01  -  17 Feb 2025 07:00  --------------------------------------------------------  IN: 0 mL / OUT: 150 mL / NET: -150 mL      Physical Exam:   General: NAD  HEENT: MELISA, EOM, anicteric sclera, dry but pink oral mucosa  Cardiovascular: +S1/S2; RRR; no M/R/G  Respiratory: CTAB; no W/R/R  Gastrointestinal: soft, ND; +BS x4, mildly tender to palpation throughout  Extremities: WWP; 2+ peripheral pulses bilaterally; no LE edema  Skin: normal color and turgor; no rash  Neurologic: AOx3, no gross focal deficits    LABS:                        14.8   21.14 )-----------( 461      ( 16 Feb 2025 17:43 )             44.3     02-16    137  |  105  |  16  ----------------------------<  137[H]  3.6   |  20[L]  |  0.95    Ca    9.6      16 Feb 2025 17:43    TPro  8.2  /  Alb  4.6  /  TBili  0.4  /  DBili  x   /  AST  15  /  ALT  18  /  AlkPhos  62  02-16      Urinalysis Basic - ( 16 Feb 2025 17:43 )    Color: x / Appearance: x / SG: x / pH: x  Gluc: 137 mg/dL / Ketone: x  / Bili: x / Urobili: x   Blood: x / Protein: x / Nitrite: x   Leuk Esterase: x / RBC: x / WBC x   Sq Epi: x / Non Sq Epi: x / Bacteria: x      CAPILLARY BLOOD GLUCOSE      POCT Blood Glucose.: 139 mg/dL (16 Feb 2025 17:13)        RADIOLOGY & ADDITIONAL TESTS:    Consultant(s) Notes Reviewed:  [x ] YES  [ ] NO    MEDICATIONS  (STANDING):  ARIPiprazole 5 milliGRAM(s) Oral daily  buPROPion XL (24-Hour) . 200 milliGRAM(s) Oral daily  lurasidone 40 milliGRAM(s) Oral daily  sertraline 75 milliGRAM(s) Oral daily  topiramate 25 milliGRAM(s) Oral at bedtime    MEDICATIONS  (PRN):  acetaminophen     Tablet .. 650 milliGRAM(s) Oral every 6 hours PRN Temp greater or equal to 38C (100.4F), Mild Pain (1 - 3)  aluminum hydroxide/magnesium hydroxide/simethicone Suspension 30 milliLiter(s) Oral every 4 hours PRN Dyspepsia  clonazePAM  Tablet 0.5 milliGRAM(s) Oral every 12 hours PRN for anxiety  haloperidol    Injectable 0.5 milliGRAM(s) IntraMuscular every 6 hours PRN nausea & vomiting  melatonin 3 milliGRAM(s) Oral at bedtime PRN Insomnia  ondansetron Injectable 4 milliGRAM(s) IV Push every 8 hours PRN Nausea and/or Vomiting      Care Discussed with Consultants/Other Providers [ x] YES  [ ] NO

## 2025-02-17 NOTE — H&P ADULT - NSHPPHYSICALEXAM_GEN_ALL_CORE
General: NAD  HEENT: MELISA, EOM, anicteric sclera, MMM  Cardiovascular: +S1/S2; RRR; no M/R/G  Respiratory: CTAB; no W/R/R  Gastrointestinal: soft, NT/ND; +BS x4  Extremities: WWP; 2+ peripheral pulses bilaterally; no LE edema  Skin: normal color and turgor; no rash  Neurologic: AOx3, no focal deficits

## 2025-02-17 NOTE — H&P ADULT - PROBLEM SELECTOR PLAN 4
F: none  E: replete as needed  N: regular diet   VTE ppx: lovenox sq  GI ppx: pantoprazole 40  Dispo: F  Code status: full

## 2025-02-17 NOTE — H&P ADULT - ASSESSMENT
52F PMHx bipolar disorder, IBS, and cannabinoid hyperemesis syndrome (reports 13 hospitalizations in 2022 for [facility], most recent admission in july 2024), who presents with nausea and vomiting she describes as similar to prior episodes of repeated vomiting.

## 2025-02-17 NOTE — H&P ADULT - HISTORY OF PRESENT ILLNESS
52F PMHx bipolar disorder, IBS, and cannabinoid hyperemesis syndrome (reports 13 hospitalizations in 2022 for [facility], most recent admission in july 2024), who presents with nausea and vomiting she describes as similar to prior episodes of repeated vomiting. reports 8 episodes of nbnb emesis on day of presentation, w associated inability to tolerate po intake x1d. per pt, haldol is the only intervention she has found to consistently provide some releif of sx. she denies any fever, chills, headache, chest pain, diarrhea, constipation. she also denies active cannabis use given her predisposition to chs, but says that she continues to experience episode of cyclic vomiting despite abstinence.    ed -  155/99, hr 97, 99.3f, 98% on room air  wbc 21, co2 20

## 2025-02-17 NOTE — DISCHARGE NOTE PROVIDER - NSDCMRMEDTOKEN_GEN_ALL_CORE_FT
ARIPiprazole 5 mg oral tablet: 1 tab(s) orally once a day  BUPROPION SR 200MG TABLETS (12 HR): 1 tab(s) orally once a day TAKE 1 TABLET BY MOUTH TWICE DAILY  CLONAZEPAM 0.5MG TABLETS: 1 tab(s) orally every 12 hours as needed for  anxiety TAKE 2 AND 1/2 TABLET BY MOUTH ONCE DAILY  Latuda 40 mg oral tablet: 1 tab(s) orally once a day (at bedtime)  sertraline 25 mg oral tablet: 3 tab(s) orally every 24 hours  TOPIRAMATE 25MG TABLETS: 1 tab(s) orally once a day (at bedtime)   ARIPiprazole 5 mg oral tablet: 1 tab(s) orally once a day  BUPROPION SR 200MG TABLETS (12 HR): 1 tab(s) orally once a day TAKE 1 TABLET BY MOUTH TWICE DAILY  CLONAZEPAM 0.5MG TABLETS: 1 tab(s) orally every 12 hours as needed for  anxiety TAKE 2 AND 1/2 TABLET BY MOUTH ONCE DAILY  Latuda 40 mg oral tablet: 1 tab(s) orally once a day (at bedtime)  ondansetron 4 mg oral tablet: 1 tab(s) orally every 6 hours as needed for  nausea  sertraline 25 mg oral tablet: 3 tab(s) orally every 24 hours  TOPIRAMATE 25MG TABLETS: 1 tab(s) orally once a day (at bedtime)

## 2025-02-18 LAB
ANION GAP SERPL CALC-SCNC: 14 MMOL/L — SIGNIFICANT CHANGE UP (ref 5–17)
BUN SERPL-MCNC: 13 MG/DL — SIGNIFICANT CHANGE UP (ref 7–23)
CALCIUM SERPL-MCNC: 9.1 MG/DL — SIGNIFICANT CHANGE UP (ref 8.4–10.5)
CHLORIDE SERPL-SCNC: 106 MMOL/L — SIGNIFICANT CHANGE UP (ref 96–108)
CO2 SERPL-SCNC: 21 MMOL/L — LOW (ref 22–31)
CREAT SERPL-MCNC: 0.86 MG/DL — SIGNIFICANT CHANGE UP (ref 0.5–1.3)
EGFR: 79 ML/MIN/1.73M2 — SIGNIFICANT CHANGE UP
GLUCOSE SERPL-MCNC: 116 MG/DL — HIGH (ref 70–99)
MAGNESIUM SERPL-MCNC: 1.9 MG/DL — SIGNIFICANT CHANGE UP (ref 1.6–2.6)
PHOSPHATE SERPL-MCNC: 2.6 MG/DL — SIGNIFICANT CHANGE UP (ref 2.5–4.5)
POTASSIUM SERPL-MCNC: 3.3 MMOL/L — LOW (ref 3.5–5.3)
POTASSIUM SERPL-SCNC: 3.3 MMOL/L — LOW (ref 3.5–5.3)
SODIUM SERPL-SCNC: 141 MMOL/L — SIGNIFICANT CHANGE UP (ref 135–145)

## 2025-02-18 PROCEDURE — 99233 SBSQ HOSP IP/OBS HIGH 50: CPT

## 2025-02-18 RX ORDER — PROCHLORPERAZINE 25 MG
2.5 SUPPOSITORY, RECTAL RECTAL EVERY 4 HOURS
Refills: 0 | Status: COMPLETED | OUTPATIENT
Start: 2025-02-18 | End: 2025-02-18

## 2025-02-18 RX ORDER — PROCHLORPERAZINE 25 MG
2.5 SUPPOSITORY, RECTAL RECTAL EVERY 4 HOURS
Refills: 0 | Status: DISCONTINUED | OUTPATIENT
Start: 2025-02-18 | End: 2025-02-18

## 2025-02-18 RX ORDER — PROCHLORPERAZINE 25 MG
2.5 SUPPOSITORY, RECTAL RECTAL ONCE
Refills: 0 | Status: COMPLETED | OUTPATIENT
Start: 2025-02-18 | End: 2025-02-18

## 2025-02-18 RX ORDER — ONDANSETRON HCL/PF 4 MG/2 ML
4 VIAL (ML) INJECTION ONCE
Refills: 0 | Status: DISCONTINUED | OUTPATIENT
Start: 2025-02-18 | End: 2025-02-18

## 2025-02-18 RX ORDER — LORAZEPAM 4 MG/ML
0.5 VIAL (ML) INJECTION ONCE
Refills: 0 | Status: DISCONTINUED | OUTPATIENT
Start: 2025-02-18 | End: 2025-02-18

## 2025-02-18 RX ADMIN — ENOXAPARIN SODIUM 40 MILLIGRAM(S): 100 INJECTION SUBCUTANEOUS at 23:32

## 2025-02-18 RX ADMIN — ENOXAPARIN SODIUM 40 MILLIGRAM(S): 100 INJECTION SUBCUTANEOUS at 12:05

## 2025-02-18 RX ADMIN — Medication 2.5 MILLIGRAM(S): at 09:08

## 2025-02-18 RX ADMIN — Medication 0.5 MILLIGRAM(S): at 16:27

## 2025-02-18 RX ADMIN — TOPIRAMATE 25 MILLIGRAM(S): 25 TABLET, FILM COATED ORAL at 23:29

## 2025-02-18 RX ADMIN — SCOPOLAMINE 1 PATCH: 1 PATCH, EXTENDED RELEASE TRANSDERMAL at 07:00

## 2025-02-18 RX ADMIN — LURASIDONE HYDROCHLORIDE 40 MILLIGRAM(S): 120 TABLET, FILM COATED ORAL at 23:29

## 2025-02-18 RX ADMIN — CLONAZEPAM 0.5 MILLIGRAM(S): 0.5 TABLET ORAL at 03:32

## 2025-02-18 RX ADMIN — Medication 100 MILLIEQUIVALENT(S): at 16:28

## 2025-02-18 RX ADMIN — ARIPIPRAZOLE 5 MILLIGRAM(S): 2 TABLET ORAL at 12:04

## 2025-02-18 RX ADMIN — CLONAZEPAM 0.5 MILLIGRAM(S): 0.5 TABLET ORAL at 12:05

## 2025-02-18 RX ADMIN — Medication 100 MILLIEQUIVALENT(S): at 15:03

## 2025-02-18 RX ADMIN — Medication 20 MILLIGRAM(S): at 19:22

## 2025-02-18 RX ADMIN — CLONAZEPAM 0.5 MILLIGRAM(S): 0.5 TABLET ORAL at 23:29

## 2025-02-18 RX ADMIN — SERTRALINE 75 MILLIGRAM(S): 100 TABLET, FILM COATED ORAL at 12:04

## 2025-02-18 RX ADMIN — SCOPOLAMINE 1 PATCH: 1 PATCH, EXTENDED RELEASE TRANSDERMAL at 00:21

## 2025-02-18 RX ADMIN — Medication 2.5 MILLIGRAM(S): at 23:30

## 2025-02-18 RX ADMIN — Medication 2.5 MILLIGRAM(S): at 03:08

## 2025-02-18 RX ADMIN — Medication 100 MILLIEQUIVALENT(S): at 17:47

## 2025-02-18 RX ADMIN — SCOPOLAMINE 1 PATCH: 1 PATCH, EXTENDED RELEASE TRANSDERMAL at 18:04

## 2025-02-18 NOTE — PROGRESS NOTE ADULT - PROBLEM SELECTOR PLAN 1
pt previously diagnosed w cannabinoid hyperemesis syndrome  reports prior daily cannabis use, some period of abstinence w recurrence of cyclic vomiting   UTox on admission +ve for Benzo + THC, likely recurrence of cannabinoid hyperemesis.    - sp haldol 5mg in ed  - continue 0.5q6h prn for persistent sx (may increase up to 2mg q6h) for today pt previously diagnosed w cannabinoid hyperemesis syndrome  reports prior daily cannabis use, some period of abstinence w recurrence of cyclic vomiting   UTox on admission +ve for Benzo + THC, likely recurrence of cannabinoid hyperemesis.  sp haldol 5mg in ED with [id number] relief. She stated that Zofran usually doesn't work  S/p Compazine 2.5mg IVP q4h, patient reports best relief from nausea with Compazine    - On 2/18, trialing IV ativan 0.5mg x1 --> Compazine 2.5mg IVP x1 second-line if persistent nausea  - Plan to transition to PO anti-nausea medications (Zofran ODT) on 2/19

## 2025-02-18 NOTE — PROGRESS NOTE ADULT - SUBJECTIVE AND OBJECTIVE BOX
***INCOMPLETE NOTE*** Patient is a 54y old  Female who presents with a chief complaint of nausea & vomiting (18 Feb 2025 07:25)       INTERVAL HPI/OVERNIGHT EVENTS: Patient required additional Compazine 2.5mg IVP for persistent nausea and vomiting x2 episodes. Unable to take PO home meds due to nausea.    SUBJECTIVE: Patient seen and examined this morning. She reports persistent nausea and states she is still struggling to take PO fluids without vomiting. Patient denies fevers/chills, chest pain, shortness of breath, abdominal pain, and diarrhea.     All other review of systems negative except otherwise noted in HPI above.    MEDICATIONS  (STANDING):  ARIPiprazole 5 milliGRAM(s) Oral every 24 hours  clonazePAM  Tablet 0.5 milliGRAM(s) Oral two times a day  enoxaparin Injectable 40 milliGRAM(s) SubCutaneous every 12 hours  lurasidone 40 milliGRAM(s) Oral at bedtime  sertraline 75 milliGRAM(s) Oral every 24 hours  topiramate 25 milliGRAM(s) Oral at bedtime    MEDICATIONS  (PRN):  acetaminophen     Tablet .. 650 milliGRAM(s) Oral every 6 hours PRN Temp greater or equal to 38C (100.4F), Mild Pain (1 - 3)  aluminum hydroxide/magnesium hydroxide/simethicone Suspension 30 milliLiter(s) Oral every 4 hours PRN Dyspepsia  clonazePAM  Tablet 0.5 milliGRAM(s) Oral daily PRN anxiety  cyclobenzaprine 5 milliGRAM(s) Oral three times a day PRN Muscle Spasm  melatonin 3 milliGRAM(s) Oral at bedtime PRN Insomnia  ondansetron Injectable 4 milliGRAM(s) IV Push every 8 hours PRN Nausea and/or Vomiting    Allergies    Levaquin (Unknown)  penicillin (Unknown)  Lamictal (Unknown)    Intolerances      Vital Signs Last 24 Hrs  T(C): 37.1 (18 Feb 2025 14:03), Max: 37.2 (18 Feb 2025 05:17)  T(F): 98.8 (18 Feb 2025 14:03), Max: 98.9 (18 Feb 2025 05:17)  HR: 72 (18 Feb 2025 14:03) (72 - 84)  BP: 119/68 (18 Feb 2025 14:03) (119/68 - 176/90)  BP(mean): --  RR: 18 (18 Feb 2025 14:03) (18 - 18)  SpO2: 94% (18 Feb 2025 14:03) (94% - 94%)    Parameters below as of 18 Feb 2025 14:03  Patient On (Oxygen Delivery Method): room air      PHYSICAL EXAM:  Constitutional - NAD, Comfortable  HEENT - NCAT, EOMI  Neck - No limited ROM  Chest - Breathing comfortably on room air, CTAB   Cardio - Warm and well perfused, RRR  Abdomen - Soft, distended but non-tender, +BS  Extremities - No peripheral edema, No calf tenderness   Neurologic - Fully awake and alert. Speaking fluently  Psychiatric - Mood stable, Affect WNL    LABS:    18 Feb 2025 08:10    141    |  106    |  13     ----------------------------<  116    3.3     |  21     |  0.86     Ca    9.1        18 Feb 2025 08:10  Phos  2.6       18 Feb 2025 08:10  Mg     1.9       18 Feb 2025 08:10        CAPILLARY BLOOD GLUCOSE        BLOOD CULTURE    RADIOLOGY & ADDITIONAL TESTS:

## 2025-02-18 NOTE — PROGRESS NOTE ADULT - PROBLEM SELECTOR PLAN 2
home medication(s): buproprion 200mg SR BID, klonopin 0.5mg 2 pills standing, 1 pill PRN, abilify 5mg qd, latuda 40mg qhs, sertraline 75mg qd, topomax 25mg qhs  qtc on admission 433    - continue home medication(s)  - home bupropion is nonformulary (short acting), no equivalent dosing, patient informed will need to bring in (pt notes cannot tolerate long acting bupropion as this causes agitation/anxiety for her)  - monitor qt interval given multiple QT prolonging meds + haldol home medication(s): buproprion 200mg SR BID, klonopin 0.5mg 2 pills standing, 1 pill PRN, abilify 5mg qd, latuda 40mg qhs, sertraline 75mg qd, topomax 25mg qhs  qtc on admission 433    - continue home medication(s)      - Confirm formal med rec as patient reportedly on multiple antipsychotics  - home bupropion is nonformulary (short acting), no equivalent dosing, patient informed will need to bring in (pt notes cannot tolerate long acting bupropion as this causes agitation/anxiety for her)  - monitor qt interval given multiple QT prolonging meds + haldol

## 2025-02-19 VITALS
SYSTOLIC BLOOD PRESSURE: 164 MMHG | OXYGEN SATURATION: 95 % | DIASTOLIC BLOOD PRESSURE: 88 MMHG | TEMPERATURE: 98 F | RESPIRATION RATE: 18 BRPM | HEART RATE: 62 BPM

## 2025-02-19 LAB
ANION GAP SERPL CALC-SCNC: 14 MMOL/L — SIGNIFICANT CHANGE UP (ref 5–17)
BASOPHILS # BLD AUTO: 0.07 K/UL — SIGNIFICANT CHANGE UP (ref 0–0.2)
BASOPHILS NFR BLD AUTO: 0.4 % — SIGNIFICANT CHANGE UP (ref 0–2)
BUN SERPL-MCNC: 10 MG/DL — SIGNIFICANT CHANGE UP (ref 7–23)
CALCIUM SERPL-MCNC: 9.2 MG/DL — SIGNIFICANT CHANGE UP (ref 8.4–10.5)
CHLORIDE SERPL-SCNC: 100 MMOL/L — SIGNIFICANT CHANGE UP (ref 96–108)
CO2 SERPL-SCNC: 24 MMOL/L — SIGNIFICANT CHANGE UP (ref 22–31)
CREAT SERPL-MCNC: 0.8 MG/DL — SIGNIFICANT CHANGE UP (ref 0.5–1.3)
EGFR: 86 ML/MIN/1.73M2 — SIGNIFICANT CHANGE UP
EOSINOPHIL # BLD AUTO: 0.02 K/UL — SIGNIFICANT CHANGE UP (ref 0–0.5)
EOSINOPHIL NFR BLD AUTO: 0.1 % — SIGNIFICANT CHANGE UP (ref 0–6)
GLUCOSE SERPL-MCNC: 101 MG/DL — HIGH (ref 70–99)
HCT VFR BLD CALC: 43.7 % — SIGNIFICANT CHANGE UP (ref 34.5–45)
HGB BLD-MCNC: 14.3 G/DL — SIGNIFICANT CHANGE UP (ref 11.5–15.5)
IMM GRANULOCYTES NFR BLD AUTO: 0.8 % — SIGNIFICANT CHANGE UP (ref 0–0.9)
LYMPHOCYTES # BLD AUTO: 28.9 % — SIGNIFICANT CHANGE UP (ref 13–44)
LYMPHOCYTES # BLD AUTO: 5.39 K/UL — HIGH (ref 1–3.3)
MAGNESIUM SERPL-MCNC: 2.2 MG/DL — SIGNIFICANT CHANGE UP (ref 1.6–2.6)
MCHC RBC-ENTMCNC: 29.1 PG — SIGNIFICANT CHANGE UP (ref 27–34)
MCHC RBC-ENTMCNC: 32.7 G/DL — SIGNIFICANT CHANGE UP (ref 32–36)
MCV RBC AUTO: 89 FL — SIGNIFICANT CHANGE UP (ref 80–100)
MONOCYTES # BLD AUTO: 1.4 K/UL — HIGH (ref 0–0.9)
MONOCYTES NFR BLD AUTO: 7.5 % — SIGNIFICANT CHANGE UP (ref 2–14)
NEUTROPHILS # BLD AUTO: 11.6 K/UL — HIGH (ref 1.8–7.4)
NEUTROPHILS NFR BLD AUTO: 62.3 % — SIGNIFICANT CHANGE UP (ref 43–77)
NRBC BLD AUTO-RTO: 0 /100 WBCS — SIGNIFICANT CHANGE UP (ref 0–0)
PHOSPHATE SERPL-MCNC: 3.3 MG/DL — SIGNIFICANT CHANGE UP (ref 2.5–4.5)
PLATELET # BLD AUTO: 423 K/UL — HIGH (ref 150–400)
POTASSIUM SERPL-MCNC: 3.3 MMOL/L — LOW (ref 3.5–5.3)
POTASSIUM SERPL-SCNC: 3.3 MMOL/L — LOW (ref 3.5–5.3)
RBC # BLD: 4.91 M/UL — SIGNIFICANT CHANGE UP (ref 3.8–5.2)
RBC # FLD: 13.5 % — SIGNIFICANT CHANGE UP (ref 10.3–14.5)
SODIUM SERPL-SCNC: 138 MMOL/L — SIGNIFICANT CHANGE UP (ref 135–145)
WBC # BLD: 18.62 K/UL — HIGH (ref 3.8–10.5)
WBC # FLD AUTO: 18.62 K/UL — HIGH (ref 3.8–10.5)

## 2025-02-19 PROCEDURE — 99239 HOSP IP/OBS DSCHRG MGMT >30: CPT

## 2025-02-19 PROCEDURE — 96374 THER/PROPH/DIAG INJ IV PUSH: CPT

## 2025-02-19 PROCEDURE — 87507 IADNA-DNA/RNA PROBE TQ 12-25: CPT

## 2025-02-19 PROCEDURE — 93005 ELECTROCARDIOGRAM TRACING: CPT

## 2025-02-19 PROCEDURE — 80053 COMPREHEN METABOLIC PANEL: CPT

## 2025-02-19 PROCEDURE — 80307 DRUG TEST PRSMV CHEM ANLYZR: CPT

## 2025-02-19 PROCEDURE — 96372 THER/PROPH/DIAG INJ SC/IM: CPT

## 2025-02-19 PROCEDURE — 83690 ASSAY OF LIPASE: CPT

## 2025-02-19 PROCEDURE — 84100 ASSAY OF PHOSPHORUS: CPT

## 2025-02-19 PROCEDURE — 85025 COMPLETE CBC W/AUTO DIFF WBC: CPT

## 2025-02-19 PROCEDURE — 80048 BASIC METABOLIC PNL TOTAL CA: CPT

## 2025-02-19 PROCEDURE — 83735 ASSAY OF MAGNESIUM: CPT

## 2025-02-19 PROCEDURE — 99285 EMERGENCY DEPT VISIT HI MDM: CPT | Mod: 25

## 2025-02-19 PROCEDURE — 82962 GLUCOSE BLOOD TEST: CPT

## 2025-02-19 PROCEDURE — 83605 ASSAY OF LACTIC ACID: CPT

## 2025-02-19 PROCEDURE — 36415 COLL VENOUS BLD VENIPUNCTURE: CPT

## 2025-02-19 RX ORDER — ONDANSETRON HCL/PF 4 MG/2 ML
1 VIAL (ML) INJECTION
Qty: 12 | Refills: 0
Start: 2025-02-19 | End: 2025-02-21

## 2025-02-19 RX ADMIN — Medication 20 MILLIGRAM(S): at 09:16

## 2025-02-19 RX ADMIN — SERTRALINE 75 MILLIGRAM(S): 100 TABLET, FILM COATED ORAL at 09:22

## 2025-02-19 RX ADMIN — SCOPOLAMINE 1 PATCH: 1 PATCH, EXTENDED RELEASE TRANSDERMAL at 07:00

## 2025-02-19 RX ADMIN — CLONAZEPAM 0.5 MILLIGRAM(S): 0.5 TABLET ORAL at 09:16

## 2025-02-19 RX ADMIN — ARIPIPRAZOLE 5 MILLIGRAM(S): 2 TABLET ORAL at 09:15

## 2025-02-19 RX ADMIN — Medication 100 MILLIEQUIVALENT(S): at 12:21

## 2025-02-19 RX ADMIN — Medication 100 MILLIEQUIVALENT(S): at 15:17

## 2025-02-19 RX ADMIN — ENOXAPARIN SODIUM 40 MILLIGRAM(S): 100 INJECTION SUBCUTANEOUS at 09:16

## 2025-02-19 NOTE — PROGRESS NOTE ADULT - SUBJECTIVE AND OBJECTIVE BOX
Progress Note  INCOMPLETE NOTE    INTERVAL EVENTS:   No acute events overnight.    SUBJECTIVE:   Patient seen and examined at bedside. Condition largely unchanged from yesterday. No acute complaints at this time.    ROS:  Negative unless otherwise stated above.    PHYSICAL EXAM:  General: Alert and oriented x 3. No acute distress.   HEENT: Moist mucous membranes. Anicteric. No cervical lymphadenopathy.  Cardiovascular: Regular rate and rhythm. No murmur. Normal JVP.  Lungs: Clear to auscultation bilaterally. No accessory muscle use.  Abdomen: Soft, non-tender and non-distended. No palpable masses.  Extremities: No edema. Non-tender. Warm.  Skin: No rashes or lesions.   Neurologic: No apparent focal neurological deficits. CN II-XII grossly intact, but not individually tested.  Psychiatric: Cooperative. Appropriate mood and affect.    VITAL SIGNS:  Vital Signs Last 24 Hrs  T(C): 37.2 (19 Feb 2025 05:37), Max: 37.2 (19 Feb 2025 05:37)  T(F): 98.9 (19 Feb 2025 05:37), Max: 98.9 (19 Feb 2025 05:37)  HR: 74 (19 Feb 2025 05:37) (62 - 74)  BP: 146/88 (19 Feb 2025 05:37) (119/68 - 164/89)  BP(mean): 107 (19 Feb 2025 05:37) (107 - 107)  RR: 18 (19 Feb 2025 05:37) (18 - 18)  SpO2: 96% (19 Feb 2025 05:37) (94% - 96%)    Parameters below as of 19 Feb 2025 05:37  Patient On (Oxygen Delivery Method): room air      INPATIENT MEDICATIONS:   MEDICATIONS  (STANDING):  ARIPiprazole 5 milliGRAM(s) Oral every 24 hours  clonazePAM  Tablet 0.5 milliGRAM(s) Oral two times a day  enoxaparin Injectable 40 milliGRAM(s) SubCutaneous every 12 hours  lurasidone 40 milliGRAM(s) Oral at bedtime  sertraline 75 milliGRAM(s) Oral every 24 hours  topiramate 25 milliGRAM(s) Oral at bedtime    MEDICATIONS  (PRN):  acetaminophen     Tablet .. 650 milliGRAM(s) Oral every 6 hours PRN Temp greater or equal to 38C (100.4F), Mild Pain (1 - 3)  aluminum hydroxide/magnesium hydroxide/simethicone Suspension 30 milliLiter(s) Oral every 4 hours PRN Dyspepsia  clonazePAM  Tablet 0.5 milliGRAM(s) Oral daily PRN anxiety  cyclobenzaprine 5 milliGRAM(s) Oral three times a day PRN Muscle Spasm  melatonin 3 milliGRAM(s) Oral at bedtime PRN Insomnia  ondansetron Injectable 4 milliGRAM(s) IV Push every 8 hours PRN Nausea and/or Vomiting    HOME MEDICATIONS  ARIPiprazole 5 mg oral tablet: 1 tab(s) orally once a day  BUPROPION SR 200MG TABLETS (12 HR): 1 tab(s) orally once a day TAKE 1 TABLET BY MOUTH TWICE DAILY  CLONAZEPAM 0.5MG TABLETS: 1 tab(s) orally every 12 hours as needed for  anxiety TAKE 2 AND 1/2 TABLET BY MOUTH ONCE DAILY  Latuda 40 mg oral tablet: 1 tab(s) orally once a day (at bedtime)  sertraline 25 mg oral tablet: 3 tab(s) orally every 24 hours  TOPIRAMATE 25MG TABLETS: 1 tab(s) orally once a day (at bedtime)    ALLERGIES:  Allergies    Levaquin (Unknown)  penicillin (Unknown)  Lamictal (Unknown)    Intolerances    LABS:                       13.8   21.26 )-----------( 424      ( 17 Feb 2025 07:54 )             43.6     02-18    141  |  106  |  13  ----------------------------<  116[H]  3.3[L]   |  21[L]  |  0.86    Ca    9.1      18 Feb 2025 08:10  Phos  2.6     02-18  Mg     1.9     02-18        Urinalysis Basic - ( 18 Feb 2025 08:10 )    Color: x / Appearance: x / SG: x / pH: x  Gluc: 116 mg/dL / Ketone: x  / Bili: x / Urobili: x   Blood: x / Protein: x / Nitrite: x   Leuk Esterase: x / RBC: x / WBC x   Sq Epi: x / Non Sq Epi: x / Bacteria: x      CAPILLARY BLOOD GLUCOSE        RADIOLOGY & ADDITIONAL TESTS: Reviewed. Progress Note    INTERVAL EVENTS:   No acute events overnight.    SUBJECTIVE:   Patient reports she has been nauseous but slowly improving. She is able to tolerate small sips of water but no food yet.    ROS:  Negative unless otherwise stated above.    PHYSICAL EXAM:  Constitutional - NAD, Comfortable  HEENT - NCAT, EOMI  Neck - No limited ROM  Chest - Breathing comfortably on room air, CTAB   Cardio - Warm and well perfused, RRR  Abdomen - Soft, distended but non-tender, +BS  Extremities - No peripheral edema, No calf tenderness   Neurologic - Fully awake and alert. Speaking fluently  Psychiatric - Mood stable, Affect WNL      VITAL SIGNS:  Vital Signs Last 24 Hrs  T(C): 37.2 (19 Feb 2025 05:37), Max: 37.2 (19 Feb 2025 05:37)  T(F): 98.9 (19 Feb 2025 05:37), Max: 98.9 (19 Feb 2025 05:37)  HR: 74 (19 Feb 2025 05:37) (62 - 74)  BP: 146/88 (19 Feb 2025 05:37) (119/68 - 164/89)  BP(mean): 107 (19 Feb 2025 05:37) (107 - 107)  RR: 18 (19 Feb 2025 05:37) (18 - 18)  SpO2: 96% (19 Feb 2025 05:37) (94% - 96%)    Parameters below as of 19 Feb 2025 05:37  Patient On (Oxygen Delivery Method): room air      INPATIENT MEDICATIONS:   MEDICATIONS  (STANDING):  ARIPiprazole 5 milliGRAM(s) Oral every 24 hours  clonazePAM  Tablet 0.5 milliGRAM(s) Oral two times a day  enoxaparin Injectable 40 milliGRAM(s) SubCutaneous every 12 hours  lurasidone 40 milliGRAM(s) Oral at bedtime  sertraline 75 milliGRAM(s) Oral every 24 hours  topiramate 25 milliGRAM(s) Oral at bedtime    MEDICATIONS  (PRN):  acetaminophen     Tablet .. 650 milliGRAM(s) Oral every 6 hours PRN Temp greater or equal to 38C (100.4F), Mild Pain (1 - 3)  aluminum hydroxide/magnesium hydroxide/simethicone Suspension 30 milliLiter(s) Oral every 4 hours PRN Dyspepsia  clonazePAM  Tablet 0.5 milliGRAM(s) Oral daily PRN anxiety  cyclobenzaprine 5 milliGRAM(s) Oral three times a day PRN Muscle Spasm  melatonin 3 milliGRAM(s) Oral at bedtime PRN Insomnia  ondansetron Injectable 4 milliGRAM(s) IV Push every 8 hours PRN Nausea and/or Vomiting    HOME MEDICATIONS  ARIPiprazole 5 mg oral tablet: 1 tab(s) orally once a day  BUPROPION SR 200MG TABLETS (12 HR): 1 tab(s) orally once a day TAKE 1 TABLET BY MOUTH TWICE DAILY  CLONAZEPAM 0.5MG TABLETS: 1 tab(s) orally every 12 hours as needed for  anxiety TAKE 2 AND 1/2 TABLET BY MOUTH ONCE DAILY  Latuda 40 mg oral tablet: 1 tab(s) orally once a day (at bedtime)  sertraline 25 mg oral tablet: 3 tab(s) orally every 24 hours  TOPIRAMATE 25MG TABLETS: 1 tab(s) orally once a day (at bedtime)    ALLERGIES:  Allergies    Levaquin (Unknown)  penicillin (Unknown)  Lamictal (Unknown)    Intolerances    LABS:                       13.8   21.26 )-----------( 424      ( 17 Feb 2025 07:54 )             43.6     02-18    141  |  106  |  13  ----------------------------<  116[H]  3.3[L]   |  21[L]  |  0.86    Ca    9.1      18 Feb 2025 08:10  Phos  2.6     02-18  Mg     1.9     02-18        Urinalysis Basic - ( 18 Feb 2025 08:10 )    Color: x / Appearance: x / SG: x / pH: x  Gluc: 116 mg/dL / Ketone: x  / Bili: x / Urobili: x   Blood: x / Protein: x / Nitrite: x   Leuk Esterase: x / RBC: x / WBC x   Sq Epi: x / Non Sq Epi: x / Bacteria: x      CAPILLARY BLOOD GLUCOSE        RADIOLOGY & ADDITIONAL TESTS: Reviewed.

## 2025-02-19 NOTE — PROGRESS NOTE ADULT - ATTENDING COMMENTS
52F with history of bipolar disorder, IBS, and cannabinoid hyperemesis syndrome with many prior admissions, currently with active THC use, who presented with n/v likely 2/2 recurrent CHS.    #CHS  - Utox +BZD, THC  - Abd is benign, GI PCR is negative  - N/v is improving today but patient unable to tolerate PO, tried PO medications with sips of water but had emesis shortly after  - can trial additional PRN low-dose Ativan today  - QTc 433 on 2/16, can give additional low-dose IM Haldol if n/v still uncontrolled  - dispo pending PO tolerance
52F with history of bipolar disorder, IBS, and cannabinoid hyperemesis syndrome with many prior admissions, currently with active THC use, who presented with n/v likely 2/2 recurrent CHS.    #CHS  #leukocytosis  - Utox +BZD, THC  - Abd is benign, GI PCR is negative  - N/v is improving today, able to tolerate some PO  - dispo pending PO tolerance  - leukocytosis is likely reactive and now downtrending, otherwise no infectious s/s

## 2025-02-19 NOTE — PROGRESS NOTE ADULT - PROBLEM SELECTOR PLAN 2
home medication(s): buproprion 200mg SR BID, klonopin 0.5mg 2 pills standing, 1 pill PRN, abilify 5mg qd, latuda 40mg qhs, sertraline 75mg qd, topomax 25mg qhs  qtc on admission 433    - continue home medication(s)      - Confirm formal med rec as patient reportedly on multiple antipsychotics  - home bupropion is nonformulary (short acting), no equivalent dosing, patient informed will need to bring in (pt notes cannot tolerate long acting bupropion as this causes agitation/anxiety for her)  - monitor qt interval given multiple QT prolonging meds + haldol

## 2025-02-19 NOTE — DISCHARGE NOTE NURSING/CASE MANAGEMENT/SOCIAL WORK - NSDCPEFALRISK_GEN_ALL_CORE
For information on Fall & Injury Prevention, visit: [email or web address] OR  [email or web address] OR  https://www.cdc.gov/steadi/patient.html

## 2025-02-19 NOTE — DISCHARGE NOTE NURSING/CASE MANAGEMENT/SOCIAL WORK - FINANCIAL ASSISTANCE
[facility] provides services at a reduced cost to those who are determined to be eligible through [facility]’s financial assistance program. Information regarding [facility]’s financial assistance program can be found by going to [email or web address] or by calling [phone].

## 2025-02-19 NOTE — DISCHARGE NOTE NURSING/CASE MANAGEMENT/SOCIAL WORK - PATIENT PORTAL LINK FT
You can access the FollowMyHealth Patient Portal offered by [facility] by registering at the following website: [email or web address]. By joining [employer]’s FollowMyHealth portal, you will also be able to view your health information using other applications (apps) compatible with our system.

## 2025-02-19 NOTE — PROGRESS NOTE ADULT - ASSESSMENT
52F PMHx bipolar disorder, IBS, and cannabinoid hyperemesis syndrome (reports 13 hospitalizations in 2022 for [facility], most recent admission in july 2024), who presents with nausea and vomiting she describes as similar to prior episodes of repeated vomiting.  
52F PMHx bipolar disorder, IBS, and cannabinoid hyperemesis syndrome (reports 13 hospitalizations in 2022 for [facility], most recent admission in july 2024), who presents with nausea and vomiting she describes as similar to prior episodes of repeated vomiting.  
52F PMHx bipolar disorder, IBS, and cannabinoid hyperemesis syndrome (reports 13 hospitalizations in 2022 for [facility], most recent admission in july 2024), who presents with nausea and vomiting she describes as similar to prior episodes of repeated vomiting.

## 2025-02-25 DIAGNOSIS — R11.2 NAUSEA WITH VOMITING, UNSPECIFIED: ICD-10-CM

## 2025-02-25 DIAGNOSIS — Z88.1 ALLERGY STATUS TO OTHER ANTIBIOTIC AGENTS: ICD-10-CM

## 2025-02-25 DIAGNOSIS — F31.9 BIPOLAR DISORDER, UNSPECIFIED: ICD-10-CM

## 2025-02-25 DIAGNOSIS — F12.90 CANNABIS USE, UNSPECIFIED, UNCOMPLICATED: ICD-10-CM

## 2025-02-25 DIAGNOSIS — Z88.8 ALLERGY STATUS TO OTHER DRUGS, MEDICAMENTS AND BIOLOGICAL SUBSTANCES: ICD-10-CM

## 2025-02-25 DIAGNOSIS — D72.829 ELEVATED WHITE BLOOD CELL COUNT, UNSPECIFIED: ICD-10-CM

## 2025-02-25 DIAGNOSIS — Z90.81 ACQUIRED ABSENCE OF SPLEEN: ICD-10-CM

## 2025-02-25 DIAGNOSIS — Z88.0 ALLERGY STATUS TO PENICILLIN: ICD-10-CM
